# Patient Record
Sex: MALE | Race: WHITE | Employment: OTHER | URBAN - METROPOLITAN AREA
[De-identification: names, ages, dates, MRNs, and addresses within clinical notes are randomized per-mention and may not be internally consistent; named-entity substitution may affect disease eponyms.]

---

## 2017-02-22 ENCOUNTER — TELEPHONE (OUTPATIENT)
Dept: FAMILY MEDICINE CLINIC | Facility: CLINIC | Age: 72
End: 2017-02-22

## 2017-02-22 ENCOUNTER — OFFICE VISIT (OUTPATIENT)
Dept: FAMILY MEDICINE CLINIC | Facility: CLINIC | Age: 72
End: 2017-02-22

## 2017-02-22 ENCOUNTER — LAB ENCOUNTER (OUTPATIENT)
Dept: LAB | Age: 72
End: 2017-02-22
Attending: FAMILY MEDICINE
Payer: MEDICARE

## 2017-02-22 VITALS
DIASTOLIC BLOOD PRESSURE: 79 MMHG | HEART RATE: 79 BPM | SYSTOLIC BLOOD PRESSURE: 143 MMHG | BODY MASS INDEX: 29.92 KG/M2 | HEIGHT: 70 IN | WEIGHT: 209 LBS | TEMPERATURE: 98 F

## 2017-02-22 DIAGNOSIS — Z00.00 PHYSICAL EXAM: Primary | ICD-10-CM

## 2017-02-22 DIAGNOSIS — K63.5 POLYP OF SIGMOID COLON, UNSPECIFIED TYPE: ICD-10-CM

## 2017-02-22 DIAGNOSIS — K57.30 DIVERTICULOSIS OF LARGE INTESTINE WITHOUT HEMORRHAGE: ICD-10-CM

## 2017-02-22 DIAGNOSIS — H81.09 MENIERE DISEASE, UNSPECIFIED LATERALITY: ICD-10-CM

## 2017-02-22 DIAGNOSIS — E78.00 PURE HYPERCHOLESTEROLEMIA: ICD-10-CM

## 2017-02-22 DIAGNOSIS — L72.3 SEBACEOUS CYST: ICD-10-CM

## 2017-02-22 DIAGNOSIS — Z00.00 PHYSICAL EXAM: ICD-10-CM

## 2017-02-22 LAB
ALBUMIN SERPL BCP-MCNC: 3.9 G/DL (ref 3.5–4.8)
ALBUMIN/GLOB SERPL: 1.3 {RATIO} (ref 1–2)
ALP SERPL-CCNC: 62 U/L (ref 32–100)
ALT SERPL-CCNC: 25 U/L (ref 17–63)
ANION GAP SERPL CALC-SCNC: 7 MMOL/L (ref 0–18)
APPEARANCE: CLEAR
AST SERPL-CCNC: 33 U/L (ref 15–41)
BASOPHILS # BLD: 0 K/UL (ref 0–0.2)
BASOPHILS NFR BLD: 1 %
BILIRUB SERPL-MCNC: 1.2 MG/DL (ref 0.3–1.2)
BUN SERPL-MCNC: 16 MG/DL (ref 8–20)
BUN/CREAT SERPL: 13 (ref 10–20)
CALCIUM SERPL-MCNC: 9.2 MG/DL (ref 8.5–10.5)
CHLORIDE SERPL-SCNC: 106 MMOL/L (ref 95–110)
CHOLEST SERPL-MCNC: 160 MG/DL (ref 110–200)
CO2 SERPL-SCNC: 29 MMOL/L (ref 22–32)
CREAT SERPL-MCNC: 1.23 MG/DL (ref 0.5–1.5)
EOSINOPHIL # BLD: 0.1 K/UL (ref 0–0.7)
EOSINOPHIL NFR BLD: 1 %
ERYTHROCYTE [DISTWIDTH] IN BLOOD BY AUTOMATED COUNT: 14.2 % (ref 11–15)
GLOBULIN PLAS-MCNC: 3 G/DL (ref 2.5–3.7)
GLUCOSE (URINE DIPSTICK): NEGATIVE MG/DL
GLUCOSE SERPL-MCNC: 95 MG/DL (ref 70–99)
HBA1C MFR BLD: 5.9 % (ref 4–6)
HCT VFR BLD AUTO: 44.5 % (ref 41–52)
HDLC SERPL-MCNC: 37 MG/DL
HGB BLD-MCNC: 14.9 G/DL (ref 13.5–17.5)
KETONES (URINE DIPSTICK): NEGATIVE MG/DL
LDLC SERPL CALC-MCNC: 110 MG/DL (ref 0–99)
LEUKOCYTES: NEGATIVE
LYMPHOCYTES # BLD: 1.5 K/UL (ref 1–4)
LYMPHOCYTES NFR BLD: 30 %
MCH RBC QN AUTO: 29.7 PG (ref 27–32)
MCHC RBC AUTO-ENTMCNC: 33.4 G/DL (ref 32–37)
MCV RBC AUTO: 88.9 FL (ref 80–100)
MONOCYTES # BLD: 0.5 K/UL (ref 0–1)
MONOCYTES NFR BLD: 9 %
MULTISTIX LOT#: NORMAL NUMERIC
NEUTROPHILS # BLD AUTO: 3 K/UL (ref 1.8–7.7)
NEUTROPHILS NFR BLD: 59 %
NITRITE, URINE: NEGATIVE
NONHDLC SERPL-MCNC: 123 MG/DL
OSMOLALITY UR CALC.SUM OF ELEC: 295 MOSM/KG (ref 275–295)
PH, URINE: 7.5 (ref 4.5–8)
PLATELET # BLD AUTO: 194 K/UL (ref 140–400)
PMV BLD AUTO: 8.5 FL (ref 7.4–10.3)
POTASSIUM SERPL-SCNC: 4.2 MMOL/L (ref 3.3–5.1)
PROT SERPL-MCNC: 6.9 G/DL (ref 5.9–8.4)
PROTEIN (URINE DIPSTICK): NEGATIVE MG/DL
PSA SERPL-MCNC: 1.2 NG/ML (ref 0–4)
RBC # BLD AUTO: 5.01 M/UL (ref 4.5–5.9)
SODIUM SERPL-SCNC: 142 MMOL/L (ref 136–144)
SPECIFIC GRAVITY: 1 (ref 1–1.03)
TRIGL SERPL-MCNC: 65 MG/DL (ref 1–149)
TSH SERPL-ACNC: 1.95 UIU/ML (ref 0.34–5.6)
URINE-COLOR: YELLOW
UROBILINOGEN,SEMI-QN: 0.2 MG/DL (ref 0–1.9)
WBC # BLD AUTO: 5.1 K/UL (ref 4–11)

## 2017-02-22 PROCEDURE — 81002 URINALYSIS NONAUTO W/O SCOPE: CPT | Performed by: FAMILY MEDICINE

## 2017-02-22 PROCEDURE — 82306 VITAMIN D 25 HYDROXY: CPT | Performed by: FAMILY MEDICINE

## 2017-02-22 PROCEDURE — 96160 PT-FOCUSED HLTH RISK ASSMT: CPT | Performed by: FAMILY MEDICINE

## 2017-02-22 PROCEDURE — 93010 ELECTROCARDIOGRAM REPORT: CPT | Performed by: FAMILY MEDICINE

## 2017-02-22 PROCEDURE — 93005 ELECTROCARDIOGRAM TRACING: CPT | Performed by: FAMILY MEDICINE

## 2017-02-22 PROCEDURE — 80061 LIPID PANEL: CPT

## 2017-02-22 PROCEDURE — 83036 HEMOGLOBIN GLYCOSYLATED A1C: CPT

## 2017-02-22 PROCEDURE — G0009 ADMIN PNEUMOCOCCAL VACCINE: HCPCS | Performed by: FAMILY MEDICINE

## 2017-02-22 PROCEDURE — 36415 COLL VENOUS BLD VENIPUNCTURE: CPT

## 2017-02-22 PROCEDURE — 90732 PPSV23 VACC 2 YRS+ SUBQ/IM: CPT | Performed by: FAMILY MEDICINE

## 2017-02-22 PROCEDURE — 80053 COMPREHEN METABOLIC PANEL: CPT

## 2017-02-22 PROCEDURE — 85025 COMPLETE CBC W/AUTO DIFF WBC: CPT

## 2017-02-22 PROCEDURE — 84443 ASSAY THYROID STIM HORMONE: CPT

## 2017-02-22 PROCEDURE — 84153 ASSAY OF PSA TOTAL: CPT | Performed by: FAMILY MEDICINE

## 2017-02-22 RX ORDER — BRAN 5 G
1 WAFER ORAL DAILY
Qty: 100 TABLET | Refills: 6 | Status: SHIPPED | OUTPATIENT
Start: 2017-02-22 | End: 2018-03-14

## 2017-02-22 RX ORDER — CHLORAL HYDRATE 500 MG
1000 CAPSULE ORAL DAILY
COMMUNITY
End: 2017-12-18

## 2017-02-22 NOTE — PROGRESS NOTES
HPI:   Royal Montelongo is a 81year old male who presents for a Medicare Subsequent Annual Wellness visit (Pt already had Initial Annual Wellness). Royal Montelongo is a 81year old male is here for routine periodic health screening and examination.   His Meniere disease (2013); Onychomycosis (2010); Lipid screening (10/1/2015); and High cholesterol. He  has past surgical history that includes tonsillectomy; adenoidectomy; vasectomy; and colonoscopy.     His family history includes Dementia in his mother; following:    Height as of this encounter: 5' 10\" (1.778 m). Weight as of this encounter: 209 lb (94.802 kg).     Medicare Hearing Assessment  (Required for AWV/SWV)    Hearing Screening    Screening Method:  Questionnaire      I have a problem hearing light and accomodation bilaterally; clear sclera without icteric. Normal tympanic membranes, normal light reflex bilaterally. Normal finger rubbing hearing exam, bilaterally. Clear nostril normal nasal mucosa.   Throat clear without exudate, lesions or en this visit:    Physical exam  -     POC Urinalysis, Manual Dip without microscopy [[id number]]  -     CBC W Differential W Platelet [E]; Future  -     Comp Metabolic Panel (14) [E]; Future  -     EKG In-Office [[id number]]  -     Hemoglobin A1C [E];  Future  -     [facility] without microscopy [[id number]]  -     CBC W Differential W Platelet [E]; Future  -     Comp Metabolic Panel (14) [E]; Future  -     EKG In-Office [[id number]]  -     Hemoglobin A1C [E]; Future  -     Lipid Panel [E];  Future  -     POC Urinalysis, Manual Dip without intercourse until he is ready to form a family. Use of condoms may prevent transmission of infections as well as pregnancy.     FOLLOW-UP: Schedule a follow-up visit in 12 months.  - POC Urinalysis, Manual Dip without microscopy [[id number]]  - CBC W Differentia or a female age 47-67, do you take aspirin?: No    Have you had any immunizations at another office such as Influenza, Hepatitis B, Tetanus, or Pneumococcal?: Yes     Functional Ability     Bathing or Showering: Able without help    Toileting: Able without month is it?: Correct    What year is it?: Correct    Recall \"Ball\": Correct    Recall \"Flag\": Correct    Recall \"Tree\": Correct       This section provided for quick review of chart, separate sheet to patient  PREVENTATIVE SERVICES  INDICATIONS AND Annual Monitoring of Persistent     Medications (ACE/ARB, digoxin diuretics, anticonvulsants.)    Potassium  Annually POTASSIUM (P) (mmol/L)   Date Value   02/17/2016 4.0    No flowsheet data found.     Creatinine  Annually CREATININE (P) (mg/dL)   Date V

## 2017-02-22 NOTE — TELEPHONE ENCOUNTER
[facility] stated that Vitamins-Lipotropics (LIPOFLAVONOID) Oral Tab is discontinued, OTC multivitamin? Please advise.

## 2017-02-23 NOTE — TELEPHONE ENCOUNTER
DR Karon Chávez: please note what you need patient to take; apparently pharmacy does not carry Lipotropics.

## 2017-02-24 LAB — 25(OH)D3 SERPL-MCNC: 23.1 NG/ML

## 2017-02-27 ENCOUNTER — TELEPHONE (OUTPATIENT)
Dept: FAMILY MEDICINE CLINIC | Facility: CLINIC | Age: 72
End: 2017-02-27

## 2017-02-27 NOTE — TELEPHONE ENCOUNTER
----- Message from Anneliese Yeh MD sent at 2/22/2017  4:43 PM CST -----  Please call patient, results are within normal limits.

## 2017-07-19 ENCOUNTER — TELEPHONE (OUTPATIENT)
Dept: FAMILY MEDICINE CLINIC | Facility: CLINIC | Age: 72
End: 2017-07-19

## 2017-11-30 ENCOUNTER — TELEPHONE (OUTPATIENT)
Dept: INTERNAL MEDICINE CLINIC | Facility: CLINIC | Age: 72
End: 2017-11-30

## 2017-11-30 NOTE — TELEPHONE ENCOUNTER
Patient is eligible for a 2018 Annual Medicare Physical Exam.   Discussed in detail w/patient. Appt scheduled for 3/14/18.

## 2017-12-18 ENCOUNTER — OFFICE VISIT (OUTPATIENT)
Dept: FAMILY MEDICINE CLINIC | Facility: CLINIC | Age: 72
End: 2017-12-18

## 2017-12-18 VITALS
WEIGHT: 215 LBS | HEART RATE: 65 BPM | BODY MASS INDEX: 31 KG/M2 | TEMPERATURE: 98 F | DIASTOLIC BLOOD PRESSURE: 85 MMHG | SYSTOLIC BLOOD PRESSURE: 143 MMHG

## 2017-12-18 DIAGNOSIS — J22 ACUTE RESPIRATORY INFECTION: Primary | ICD-10-CM

## 2017-12-18 PROCEDURE — 99213 OFFICE O/P EST LOW 20 MIN: CPT | Performed by: FAMILY MEDICINE

## 2017-12-18 PROCEDURE — G0463 HOSPITAL OUTPT CLINIC VISIT: HCPCS | Performed by: FAMILY MEDICINE

## 2017-12-18 RX ORDER — SILDENAFIL 100 MG/1
100 TABLET, FILM COATED ORAL
Qty: 3 TABLET | Refills: 10 | Status: SHIPPED | OUTPATIENT
Start: 2017-12-18 | End: 2019-01-11

## 2017-12-18 RX ORDER — AZITHROMYCIN 250 MG/1
TABLET, FILM COATED ORAL
Qty: 6 TABLET | Refills: 0 | Status: SHIPPED | OUTPATIENT
Start: 2017-12-18 | End: 2018-03-14 | Stop reason: ALTCHOICE

## 2017-12-18 RX ORDER — CODEINE PHOSPHATE AND GUAIFENESIN 10; 100 MG/5ML; MG/5ML
5 SOLUTION ORAL EVERY 6 HOURS PRN
Qty: 120 ML | Refills: 1 | Status: SHIPPED | OUTPATIENT
Start: 2017-12-18 | End: 2018-03-14 | Stop reason: ALTCHOICE

## 2017-12-18 NOTE — PROGRESS NOTES
2017 10:58 AM    Antonieta Kebedered, : 1945  Patient presents with:  Chest Congestion: X 3 weeks   Runny Nose      HPI:     Antonieta Cooper is a 81year old male who presents for evaluation of a chief complaint of runny nose, sore throat, c Lipid screening 10/1/2015   • Meniere disease 2013   • Onychomycosis 2010    toes       Past Surgical History: Past Surgical History:  No date: ADENOIDECTOMY  No date: COLONOSCOPY  2013: COLONOSCOPY      Comment: colon poly  No date: TONSILLECTOMY  No date (Oral)   Wt 215 lb (97.5 kg)   BMI 30.85 kg/m²     GENERAL: well developed, well nourished, well hydrated, no distress  SKIN: good skin turgor, no obvious rashes  NECK: supple, no adenopathy, no thyromegaly  CARDIO: RRR without murmur  EXTREMITIES: no cyan call if new or worsening symptoms develop. Schedule a follow-up appointment  prn. Orders Placed This Encounter      azithromycin (ZITHROMAX Z-ABIGAIL) 250 MG Oral Tab          Sig: Take two tablets by mouth today, then one tablet daily.           Dis

## 2018-03-14 ENCOUNTER — OFFICE VISIT (OUTPATIENT)
Dept: FAMILY MEDICINE CLINIC | Facility: CLINIC | Age: 73
End: 2018-03-14

## 2018-03-14 ENCOUNTER — LAB ENCOUNTER (OUTPATIENT)
Dept: LAB | Age: 73
End: 2018-03-14
Attending: FAMILY MEDICINE
Payer: MEDICARE

## 2018-03-14 VITALS
BODY MASS INDEX: 30.49 KG/M2 | DIASTOLIC BLOOD PRESSURE: 91 MMHG | HEART RATE: 62 BPM | SYSTOLIC BLOOD PRESSURE: 144 MMHG | WEIGHT: 213 LBS | TEMPERATURE: 98 F | HEIGHT: 70 IN

## 2018-03-14 DIAGNOSIS — E78.00 PURE HYPERCHOLESTEROLEMIA: ICD-10-CM

## 2018-03-14 DIAGNOSIS — Z00.00 PHYSICAL EXAM: Primary | ICD-10-CM

## 2018-03-14 DIAGNOSIS — B35.1 ONYCHOMYCOSIS OF LEFT GREAT TOE: ICD-10-CM

## 2018-03-14 DIAGNOSIS — K57.30 DIVERTICULOSIS OF LARGE INTESTINE WITHOUT HEMORRHAGE: ICD-10-CM

## 2018-03-14 DIAGNOSIS — Z00.00 PHYSICAL EXAM: ICD-10-CM

## 2018-03-14 DIAGNOSIS — H81.09 MENIERE DISEASE, UNSPECIFIED LATERALITY: ICD-10-CM

## 2018-03-14 DIAGNOSIS — Z00.00 MEDICARE ANNUAL WELLNESS VISIT, SUBSEQUENT: ICD-10-CM

## 2018-03-14 DIAGNOSIS — K63.5 POLYP OF SIGMOID COLON, UNSPECIFIED TYPE: ICD-10-CM

## 2018-03-14 LAB
ALBUMIN SERPL BCP-MCNC: 4 G/DL (ref 3.5–4.8)
ALBUMIN/GLOB SERPL: 1.3 {RATIO} (ref 1–2)
ALP SERPL-CCNC: 66 U/L (ref 32–100)
ALT SERPL-CCNC: 25 U/L (ref 17–63)
ANION GAP SERPL CALC-SCNC: 4 MMOL/L (ref 0–18)
AST SERPL-CCNC: 39 U/L (ref 15–41)
BASOPHILS # BLD: 0 K/UL (ref 0–0.2)
BASOPHILS NFR BLD: 0 %
BILIRUB SERPL-MCNC: 1.1 MG/DL (ref 0.3–1.2)
BILIRUB UR QL: NEGATIVE
BUN SERPL-MCNC: 14 MG/DL (ref 8–20)
BUN/CREAT SERPL: 11.2 (ref 10–20)
CALCIUM SERPL-MCNC: 9.1 MG/DL (ref 8.5–10.5)
CHLORIDE SERPL-SCNC: 104 MMOL/L (ref 95–110)
CHOLEST SERPL-MCNC: 214 MG/DL (ref 110–200)
CLARITY UR: CLEAR
CO2 SERPL-SCNC: 31 MMOL/L (ref 22–32)
COLOR UR: YELLOW
CREAT SERPL-MCNC: 1.25 MG/DL (ref 0.5–1.5)
EOSINOPHIL # BLD: 0.1 K/UL (ref 0–0.7)
EOSINOPHIL NFR BLD: 1 %
ERYTHROCYTE [DISTWIDTH] IN BLOOD BY AUTOMATED COUNT: 14.2 % (ref 11–15)
GLOBULIN PLAS-MCNC: 3 G/DL (ref 2.5–3.7)
GLUCOSE SERPL-MCNC: 88 MG/DL (ref 70–99)
GLUCOSE UR-MCNC: NEGATIVE MG/DL
HBA1C MFR BLD: 5.7 % (ref 4–6)
HCT VFR BLD AUTO: 45.4 % (ref 41–52)
HDLC SERPL-MCNC: 44 MG/DL
HGB BLD-MCNC: 15.5 G/DL (ref 13.5–17.5)
HGB UR QL STRIP.AUTO: NEGATIVE
KETONES UR-MCNC: NEGATIVE MG/DL
LDLC SERPL CALC-MCNC: 154 MG/DL (ref 0–99)
LEUKOCYTE ESTERASE UR QL STRIP.AUTO: NEGATIVE
LYMPHOCYTES # BLD: 1.7 K/UL (ref 1–4)
LYMPHOCYTES NFR BLD: 28 %
MCH RBC QN AUTO: 30.7 PG (ref 27–32)
MCHC RBC AUTO-ENTMCNC: 34.1 G/DL (ref 32–37)
MCV RBC AUTO: 89.9 FL (ref 80–100)
MONOCYTES # BLD: 0.7 K/UL (ref 0–1)
MONOCYTES NFR BLD: 12 %
NEUTROPHILS # BLD AUTO: 3.6 K/UL (ref 1.8–7.7)
NEUTROPHILS NFR BLD: 59 %
NITRITE UR QL STRIP.AUTO: NEGATIVE
NONHDLC SERPL-MCNC: 170 MG/DL
OSMOLALITY UR CALC.SUM OF ELEC: 288 MOSM/KG (ref 275–295)
PATIENT FASTING: YES
PH UR: 6 [PH] (ref 5–8)
PLATELET # BLD AUTO: 209 K/UL (ref 140–400)
PMV BLD AUTO: 8.3 FL (ref 7.4–10.3)
POTASSIUM SERPL-SCNC: 4.1 MMOL/L (ref 3.3–5.1)
PROT SERPL-MCNC: 7 G/DL (ref 5.9–8.4)
PROT UR-MCNC: 30 MG/DL
PSA SERPL-MCNC: 1.3 NG/ML (ref 0–4)
RBC # BLD AUTO: 5.05 M/UL (ref 4.5–5.9)
RBC #/AREA URNS AUTO: 1 /HPF
RBC #/AREA URNS AUTO: 2 /HPF
SODIUM SERPL-SCNC: 139 MMOL/L (ref 136–144)
SP GR UR STRIP: 1.03 (ref 1–1.03)
TRIGL SERPL-MCNC: 78 MG/DL (ref 1–149)
TSH SERPL-ACNC: 2.91 UIU/ML (ref 0.45–5.33)
UROBILINOGEN UR STRIP-ACNC: 2
VIT C UR-MCNC: NEGATIVE MG/DL
WBC # BLD AUTO: 6.2 K/UL (ref 4–11)
WBC #/AREA URNS AUTO: 1 /HPF
WBC #/AREA URNS AUTO: 1 /HPF

## 2018-03-14 PROCEDURE — 81001 URINALYSIS AUTO W/SCOPE: CPT

## 2018-03-14 PROCEDURE — 93010 ELECTROCARDIOGRAM REPORT: CPT | Performed by: FAMILY MEDICINE

## 2018-03-14 PROCEDURE — G0439 PPPS, SUBSEQ VISIT: HCPCS | Performed by: FAMILY MEDICINE

## 2018-03-14 PROCEDURE — 96160 PT-FOCUSED HLTH RISK ASSMT: CPT | Performed by: FAMILY MEDICINE

## 2018-03-14 PROCEDURE — 85025 COMPLETE CBC W/AUTO DIFF WBC: CPT

## 2018-03-14 PROCEDURE — 84153 ASSAY OF PSA TOTAL: CPT | Performed by: FAMILY MEDICINE

## 2018-03-14 PROCEDURE — 81015 MICROSCOPIC EXAM OF URINE: CPT | Performed by: FAMILY MEDICINE

## 2018-03-14 PROCEDURE — 93005 ELECTROCARDIOGRAM TRACING: CPT

## 2018-03-14 PROCEDURE — 83036 HEMOGLOBIN GLYCOSYLATED A1C: CPT

## 2018-03-14 PROCEDURE — 84443 ASSAY THYROID STIM HORMONE: CPT

## 2018-03-14 PROCEDURE — 80053 COMPREHEN METABOLIC PANEL: CPT

## 2018-03-14 PROCEDURE — 36415 COLL VENOUS BLD VENIPUNCTURE: CPT

## 2018-03-14 PROCEDURE — 80061 LIPID PANEL: CPT

## 2018-03-14 PROCEDURE — 82306 VITAMIN D 25 HYDROXY: CPT | Performed by: FAMILY MEDICINE

## 2018-03-14 RX ORDER — MECLIZINE HCL 12.5 MG/1
12.5 TABLET ORAL 3 TIMES DAILY PRN
Qty: 40 TABLET | Refills: 0 | Status: SHIPPED | OUTPATIENT
Start: 2018-03-14 | End: 2019-01-11

## 2018-03-14 RX ORDER — FLUCONAZOLE 200 MG/1
200 TABLET ORAL WEEKLY
Qty: 12 TABLET | Refills: 2 | Status: SHIPPED | OUTPATIENT
Start: 2018-03-14 | End: 2018-04-13

## 2018-03-14 NOTE — PROGRESS NOTES
Please call patient, results are within normal limits, except high cholesterol make sure he is taking his medication and following a low fat diet.

## 2018-03-14 NOTE — PROGRESS NOTES
HPI:   Cathleen Leyva is a 72year old male who presents for a Medicare Subsequent Annual Wellness visit (Pt already had Initial Annual Wellness).     Cathleen Leyva is a 72year old male is here for routine periodic health screening and examination this document but we do not have it in [facility], and patient is instructed to get our office a copy of it for scanning into Epic.            He has never smoked tobacco.    CAGE Alcohol screening   Man Hanley was screened for Alcohol abuse and had a sco adenoidectomy; vasectomy; colonoscopy; and colonoscopy (2013). His family history includes Dementia in his mother; Diabetes in his brother and sister; Hypertension in his brother; Lipids in his brother.    SOCIAL HISTORY:   He  reports that he has never following:    Height as of this encounter: 5' 10\" (1.778 m). Weight as of this encounter: 213 lb (96.6 kg).     Medicare Hearing Assessment  (Required for AWV/SWV)    Hearing Screening    Screening Method:  Questionnaire  I have a problem hearing over t cephalic, normal light red reflex; pupils equally reactive to light and accomodation bilaterally; clear sclera without icteric. Normal tympanic membranes, normal light reflex bilaterally. Normal finger rubbing hearing exam, bilaterally.   Clear nostril nor Future  -     EKG 12-LEAD; Future  -     HEMOGLOBIN A1C; Future  -     LIPID PANEL;  Future  -     PSA TOTAL W REFLEX TO FREE  -     TSH W REFLEX TO FREE T4; Future  -     URINALYSIS, ROUTINE; Future  -     URINE MICROSCOPIC W REFLEX CULTURE  -     VITAMIN W Reflex To Free T4 [E]      Urinalysis, Routine [E]      Urine Microscopic w Reflex CULTURE      Vitamin D, 25-Hydroxy [E]   In-House; Urine dip. Test/Procedures done today include: EKG. IMMUNIZATIONS: none given today.     RECOMMENDATIONS given includ tablet 2      Sig: Take 1 tablet (200 mg total) by mouth once a week. RECOMMENDATIONS given include: Please, call our office with any questions or concerns.  Notify Dr Saeid Ny or the [facility] if there is a deterioration or worsening of the med 06/03/2023 Update Health Maintenance if applicable    Flex Sigmoidoscopy Screen every 10 years No results found for this or any previous visit. No flowsheet data found. Fecal Occult Blood Annually No results found for: FOB No flowsheet data found.     G

## 2018-03-16 LAB — 25(OH)D3 SERPL-MCNC: 24.3 NG/ML

## 2018-03-17 RX ORDER — ERGOCALCIFEROL 1.25 MG/1
50000 CAPSULE ORAL WEEKLY
Qty: 12 CAPSULE | Refills: 4 | Status: SHIPPED | OUTPATIENT
Start: 2018-03-17 | End: 2018-04-16

## 2018-03-26 ENCOUNTER — TELEPHONE (OUTPATIENT)
Dept: FAMILY MEDICINE CLINIC | Facility: CLINIC | Age: 73
End: 2018-03-26

## 2018-03-26 DIAGNOSIS — H90.6 MIXED CONDUCTIVE AND SENSORINEURAL HEARING LOSS OF BOTH EARS: Primary | ICD-10-CM

## 2018-03-26 NOTE — TELEPHONE ENCOUNTER
[facility] calling to confirm what cholestrol meds should have been called in per the Pt. And states need a script for it.

## 2018-03-27 NOTE — TELEPHONE ENCOUNTER
Per chart review patient is not on any cholesterol medication. Patient was on Omega-3 tablets before but that was discontinued. Message routed to provider to confirm what cholesterol medication is patient suppose to be on.        Please reply to pool: LESLY WALTERS

## 2018-04-16 ENCOUNTER — OFFICE VISIT (OUTPATIENT)
Dept: AUDIOLOGY | Facility: CLINIC | Age: 73
End: 2018-04-16

## 2018-04-16 DIAGNOSIS — H93.11 TINNITUS, RIGHT: ICD-10-CM

## 2018-04-16 DIAGNOSIS — R42 DIZZINESS: ICD-10-CM

## 2018-04-16 DIAGNOSIS — H90.3 HEARING LOSS, SENSORINEURAL, ASYMMETRICAL: Primary | ICD-10-CM

## 2018-04-16 PROCEDURE — 92557 COMPREHENSIVE HEARING TEST: CPT | Performed by: AUDIOLOGIST

## 2018-04-16 PROCEDURE — 92567 TYMPANOMETRY: CPT | Performed by: AUDIOLOGIST

## 2018-04-16 NOTE — PROGRESS NOTES
AUDIOGRAM     Manuel Hamilton was referred for testing by Abhi Donnelly. 1/2/1945  [id number]    Pt noted a history of right sudden hearing loss followed by dizziness and tinnitus.  PT noted difficulty hearing/understanding speech on his right side, isolation, depression, and cognitive decline. Pt expressed interest in amplification. A benefit verification will be completed and then pt will schedule a hearing aid evaluation. Recommendations:   Follow-up with Dr Lucas Herndon;  RTC for a hearing aid e

## 2018-04-23 ENCOUNTER — TELEPHONE (OUTPATIENT)
Dept: AUDIOLOGY | Facility: CLINIC | Age: 73
End: 2018-04-23

## 2018-04-23 NOTE — TELEPHONE ENCOUNTER
Please provide the CPT for the hearing aid device.      Thank you,  [facility]   [phone]

## 2018-04-24 NOTE — H&P
[address] [facility] [address] Gastroenterology                                                                                                  Clinic History and Physical     Pa TONSILLECTOMY  No date: VASECTOMY   Family Hx:   Family History   Problem Relation Age of Onset   • Dementia Mother      Alzheimer's   • Hypertension Brother    • Lipids Brother      hyperlipidemia   • Diabetes Brother      type 2   • Diabetes Sister fever  ENDOCRINE:  negative for cold intolerance and heat intolerance  MUSCULOSKELETAL:  negative for joint effusion/severe erythema  BEHAVIOR/PSYCH:  negative for psychotic behavior      PHYSICAL EXAM:   Blood pressure 132/82, pulse 79, height 5' 10\" (1. colyte or trilyte.   -Continue all medications as prescribed    Colonoscopy consent: I have discussed the risks, benefits, and alternatives to colonoscopy with the patient [who demonstrated understanding], including but not limited to the risks of bleeding

## 2018-04-25 ENCOUNTER — OFFICE VISIT (OUTPATIENT)
Dept: GASTROENTEROLOGY | Facility: CLINIC | Age: 73
End: 2018-04-25

## 2018-04-25 ENCOUNTER — TELEPHONE (OUTPATIENT)
Dept: GASTROENTEROLOGY | Facility: CLINIC | Age: 73
End: 2018-04-25

## 2018-04-25 VITALS
BODY MASS INDEX: 30.61 KG/M2 | SYSTOLIC BLOOD PRESSURE: 132 MMHG | DIASTOLIC BLOOD PRESSURE: 82 MMHG | HEART RATE: 79 BPM | WEIGHT: 213.81 LBS | HEIGHT: 70 IN

## 2018-04-25 DIAGNOSIS — K63.5 POLYP OF COLON, UNSPECIFIED PART OF COLON, UNSPECIFIED TYPE: ICD-10-CM

## 2018-04-25 DIAGNOSIS — Z80.0 FAMILY HX OF COLON CANCER: ICD-10-CM

## 2018-04-25 DIAGNOSIS — K57.30 DIVERTICULOSIS OF LARGE INTESTINE WITHOUT DIVERTICULITIS: ICD-10-CM

## 2018-04-25 DIAGNOSIS — Z12.11 ENCOUNTER FOR SCREENING COLONOSCOPY: ICD-10-CM

## 2018-04-25 DIAGNOSIS — Z12.11 COLON CANCER SCREENING: Primary | ICD-10-CM

## 2018-04-25 DIAGNOSIS — K57.30 DIVERTICULOSIS OF LARGE INTESTINE WITHOUT HEMORRHAGE: ICD-10-CM

## 2018-04-25 DIAGNOSIS — K63.5 POLYP OF COLON, UNSPECIFIED PART OF COLON, UNSPECIFIED TYPE: Primary | ICD-10-CM

## 2018-04-25 DIAGNOSIS — Z80.0 FAMILY HISTORY OF COLON CANCER: ICD-10-CM

## 2018-04-25 PROCEDURE — G0463 HOSPITAL OUTPT CLINIC VISIT: HCPCS | Performed by: PHYSICIAN ASSISTANT

## 2018-04-25 PROCEDURE — 99203 OFFICE O/P NEW LOW 30 MIN: CPT | Performed by: PHYSICIAN ASSISTANT

## 2018-04-25 RX ORDER — VITAMIN E (DL,TOCOPHERYL ACET) 400 UNIT
400 TABLET,CHEWABLE ORAL DAILY
COMMUNITY
Start: 2018-04-01 | End: 2019-03-16

## 2018-04-25 NOTE — TELEPHONE ENCOUNTER
Scheduled for:  Colonoscopy [id number]  Provider Name: Nigel Ocampo  Date:  5/1/18  Location:  [facility]   Sedation:  IVCS  Time:  0845 / Arrival 0745  Prep: Split dose Suprep  Meds/Allergies Reconciled?:  Physician reviewed   Diagnosis with codes: Polyp of colon, unspec

## 2018-05-01 ENCOUNTER — HOSPITAL ENCOUNTER (OUTPATIENT)
Facility: HOSPITAL | Age: 73
Setting detail: HOSPITAL OUTPATIENT SURGERY
Discharge: HOME | End: 2018-05-01
Attending: INTERNAL MEDICINE | Admitting: INTERNAL MEDICINE
Payer: MEDICARE

## 2018-05-01 ENCOUNTER — SURGERY (OUTPATIENT)
Age: 73
End: 2018-05-01

## 2018-05-01 DIAGNOSIS — K57.30 DIVERTICULOSIS OF LARGE INTESTINE WITHOUT DIVERTICULITIS: ICD-10-CM

## 2018-05-01 DIAGNOSIS — K63.5 POLYP OF COLON, UNSPECIFIED PART OF COLON, UNSPECIFIED TYPE: ICD-10-CM

## 2018-05-01 DIAGNOSIS — Z12.11 COLON CANCER SCREENING: ICD-10-CM

## 2018-05-01 DIAGNOSIS — Z80.0 FAMILY HISTORY OF COLON CANCER: ICD-10-CM

## 2018-05-01 PROCEDURE — 0DJD8ZZ INSPECTION OF LOWER INTESTINAL TRACT, VIA NATURAL OR ARTIFICIAL OPENING ENDOSCOPIC: ICD-10-PCS | Performed by: INTERNAL MEDICINE

## 2018-05-01 PROCEDURE — G0500 MOD SEDAT ENDO SERVICE >5YRS: HCPCS | Performed by: INTERNAL MEDICINE

## 2018-05-01 PROCEDURE — G0121 COLON CA SCRN NOT HI RSK IND: HCPCS | Performed by: INTERNAL MEDICINE

## 2018-05-01 RX ORDER — SODIUM CHLORIDE, SODIUM LACTATE, POTASSIUM CHLORIDE, CALCIUM CHLORIDE 600; 310; 30; 20 MG/100ML; MG/100ML; MG/100ML; MG/100ML
INJECTION, SOLUTION INTRAVENOUS CONTINUOUS
Status: DISCONTINUED | OUTPATIENT
Start: 2018-05-01 | End: 2018-05-01

## 2018-05-01 RX ORDER — SODIUM CHLORIDE 0.9 % (FLUSH) 0.9 %
10 SYRINGE (ML) INJECTION AS NEEDED
Status: DISCONTINUED | OUTPATIENT
Start: 2018-05-01 | End: 2018-05-01

## 2018-05-01 RX ORDER — MIDAZOLAM HYDROCHLORIDE 1 MG/ML
1 INJECTION INTRAMUSCULAR; INTRAVENOUS EVERY 5 MIN PRN
Status: DISCONTINUED | OUTPATIENT
Start: 2018-05-01 | End: 2018-05-01

## 2018-05-01 RX ORDER — MIDAZOLAM HYDROCHLORIDE 1 MG/ML
INJECTION INTRAMUSCULAR; INTRAVENOUS
Status: DISCONTINUED | OUTPATIENT
Start: 2018-05-01 | End: 2018-05-01

## 2018-05-01 NOTE — OPERATIVE REPORT
COLONOSCOPY REPORT    Carl Berg     1945 Age 72year old   PCP Berna Vann MD Endoscopist Pedro Luis Mercado MD     Date of procedure: 18    Procedure: Colonoscopy     Pre-operative diagnosis: Screening    Post-operative diagnos and anal papillae. 5. The colonic mucosa throughout the colon showed normal vascular pattern, without evidence of angioectasias or inflammation. 6. KEVIN: normal rectal tone, no masses palpated. Impression:   · Diverticulosis of the colon.   · Inte

## 2018-05-01 NOTE — H&P
History & Physical Examination    Patient Name: Edward Tubbs  MRN: [id number]  [facility]: [id number]  YOB: 1945    Diagnosis: screening for colon cancer, hx of colon polyps      Prescriptions Prior to Admission:  CALCIUM/VITAMIN D3/ADULT GUMMY 25 Comment: colon polyp  No date: TONSILLECTOMY  No date: VASECTOMY  Family History   Problem Relation Age of Onset   • Dementia Mother      Alzheimer's   • Hypertension Brother    • Lipids Brother      hyperlipidemia   • Diabetes Brother      type 2   • Diab

## 2018-05-02 VITALS
BODY MASS INDEX: 28.85 KG/M2 | RESPIRATION RATE: 15 BRPM | OXYGEN SATURATION: 95 % | WEIGHT: 213 LBS | SYSTOLIC BLOOD PRESSURE: 113 MMHG | DIASTOLIC BLOOD PRESSURE: 81 MMHG | HEART RATE: 51 BPM | HEIGHT: 72 IN

## 2018-05-03 ENCOUNTER — OFFICE VISIT (OUTPATIENT)
Dept: AUDIOLOGY | Facility: CLINIC | Age: 73
End: 2018-05-03

## 2018-05-03 ENCOUNTER — TELEPHONE (OUTPATIENT)
Dept: GASTROENTEROLOGY | Facility: CLINIC | Age: 73
End: 2018-05-03

## 2018-05-03 DIAGNOSIS — H90.3 HEARING LOSS, SENSORINEURAL, ASYMMETRICAL: Primary | ICD-10-CM

## 2018-05-03 PROCEDURE — 92591 HEARING AID EXAM, BOTH EARS: CPT | Performed by: AUDIOLOGIST

## 2018-05-03 NOTE — TELEPHONE ENCOUNTER
Entered into EPIC:Recall colon in 5 years per Dr. Yanet Solomon. Last Colon done 5/1/18, next due 5/1/23. Snapshot updated.

## 2018-05-04 NOTE — PROGRESS NOTES
Hearing Aid Evaluation    Bina Jauregui  1/2/1945  [id number]    Bina Jauregui is a first time user. For fitting of poorer , right ear benefits expected would be:  1. Better/easier speechreading  2. Better localization  3.   Brightening up left ear Casey

## 2018-06-27 ENCOUNTER — TELEPHONE (OUTPATIENT)
Dept: FAMILY MEDICINE CLINIC | Facility: CLINIC | Age: 73
End: 2018-06-27

## 2018-06-27 RX ORDER — ATORVASTATIN CALCIUM 20 MG/1
20 TABLET, FILM COATED ORAL NIGHTLY
Qty: 90 TABLET | Refills: 1 | Status: SHIPPED | OUTPATIENT
Start: 2018-06-27 | End: 2018-12-29

## 2018-06-27 NOTE — TELEPHONE ENCOUNTER
Patient states that the pharmacy has tried phoning and sending the information to the office stating that his insurance does not cover his cholesterol medication (pt is not sure of the name).  Pt would like to know if the doctor can prescribe a different me

## 2018-06-27 NOTE — TELEPHONE ENCOUNTER
Spoke with pharmacy who reports the Guillermo Nj is not covered under the patient's insurance but atorvastatin and simvastatin are covered. Patient is leaving to [facility] tomorrow, and would like a new prescription before he leaves.  Message routed to provider for

## 2018-12-29 NOTE — TELEPHONE ENCOUNTER
Please review; protocol failed. High LDL. MD review required.     Cholesterol Medications  Protocol Criteria:  · Appointment scheduled in the past 12 months or in the next 3 months  · ALT & LDL on file in the past 12 months  · ALT result < 80  · LDL result

## 2018-12-31 RX ORDER — ATORVASTATIN CALCIUM 20 MG/1
TABLET, FILM COATED ORAL
Qty: 90 TABLET | Refills: 1 | Status: SHIPPED | OUTPATIENT
Start: 2018-12-31 | End: 2019-03-16

## 2019-01-11 ENCOUNTER — OFFICE VISIT (OUTPATIENT)
Dept: FAMILY MEDICINE CLINIC | Facility: CLINIC | Age: 74
End: 2019-01-11
Payer: MEDICARE

## 2019-01-11 ENCOUNTER — NURSE TRIAGE (OUTPATIENT)
Dept: OTHER | Age: 74
End: 2019-01-11

## 2019-01-11 VITALS
HEIGHT: 72 IN | DIASTOLIC BLOOD PRESSURE: 80 MMHG | BODY MASS INDEX: 29.44 KG/M2 | TEMPERATURE: 98 F | WEIGHT: 217.38 LBS | HEART RATE: 68 BPM | SYSTOLIC BLOOD PRESSURE: 152 MMHG

## 2019-01-11 DIAGNOSIS — J22 ACUTE RESPIRATORY INFECTION: Primary | ICD-10-CM

## 2019-01-11 PROCEDURE — 99213 OFFICE O/P EST LOW 20 MIN: CPT | Performed by: FAMILY MEDICINE

## 2019-01-11 PROCEDURE — G0463 HOSPITAL OUTPT CLINIC VISIT: HCPCS | Performed by: FAMILY MEDICINE

## 2019-01-11 RX ORDER — SILDENAFIL 100 MG/1
100 TABLET, FILM COATED ORAL
Qty: 3 TABLET | Refills: 10 | Status: SHIPPED | OUTPATIENT
Start: 2019-01-11 | End: 2019-03-16

## 2019-01-11 RX ORDER — CODEINE PHOSPHATE AND GUAIFENESIN 10; 100 MG/5ML; MG/5ML
5 SOLUTION ORAL EVERY 6 HOURS PRN
Qty: 120 ML | Refills: 1 | Status: SHIPPED | OUTPATIENT
Start: 2019-01-11 | End: 2019-01-31

## 2019-01-11 RX ORDER — AZITHROMYCIN 250 MG/1
TABLET, FILM COATED ORAL
Qty: 6 TABLET | Refills: 0 | Status: SHIPPED | OUTPATIENT
Start: 2019-01-11 | End: 2019-01-31

## 2019-01-11 NOTE — TELEPHONE ENCOUNTER
Please call patient to set up an appointment with me, possibly today, now or my next day office schedule.

## 2019-01-11 NOTE — PROGRESS NOTES
2019 11:09 AM    Anastacia Martins, : 1945  Patient presents with:  URI: cough, nasal discharge, body aches, sneezing, chest congestion, nasal congestion x 6 days    HPI:     Anastacia Martins is a 69year old male who presents for evaluation of a 20120   • Hearing impairment     right ear, ringing    • High cholesterol    • Hyperlipidemia     dx'd in 2010   • Lipid screening 10/1/2015   • Meniere disease 2013   • Onychomycosis 2010    toes       Past Surgical History:   Past Surgical History:   Pro (13138)                          09/30/2015      Fluvirin, 3 Years & >, Im                          10/22/2008      Influenza             09/28/2009      Pneumovax 23          02/22/2017      TD                    04/29/2006      TDAP                  02/1 drainage  THROAT: redness noted  LUNGS: clear to auscultation bilaterally; no rales, rhonchi, or wheezes  ABDOMINAL: Soft NABS, ND, NT    Labs performed this visit:  No results found for this or any previous visit (from the past 10 hour(s)).     MDM/Assessm

## 2019-01-11 NOTE — TELEPHONE ENCOUNTER
Action Requested: Summary for Provider     []  Critical Lab, Recommendations Needed  [x] Need Additional Advice  []   FYI    []   Need Orders  [] Need Medications Sent to Pharmacy  []  Other     SUMMARY: patient has had a cough with yellow phlegm, runny no

## 2019-01-31 ENCOUNTER — APPOINTMENT (OUTPATIENT)
Dept: LAB | Age: 74
End: 2019-01-31
Attending: FAMILY MEDICINE
Payer: MEDICARE

## 2019-01-31 ENCOUNTER — OFFICE VISIT (OUTPATIENT)
Dept: FAMILY MEDICINE CLINIC | Facility: CLINIC | Age: 74
End: 2019-01-31
Payer: MEDICARE

## 2019-01-31 VITALS
HEIGHT: 72 IN | WEIGHT: 217 LBS | TEMPERATURE: 99 F | SYSTOLIC BLOOD PRESSURE: 147 MMHG | DIASTOLIC BLOOD PRESSURE: 85 MMHG | BODY MASS INDEX: 29.39 KG/M2 | HEART RATE: 64 BPM

## 2019-01-31 DIAGNOSIS — R33.9 URINE RETENTION: Primary | ICD-10-CM

## 2019-01-31 LAB
PSA FREE MFR SERPL: 7 %
PSA FREE SERPL-MCNC: 0.29 NG/ML

## 2019-01-31 PROCEDURE — 84154 ASSAY OF PSA FREE: CPT | Performed by: FAMILY MEDICINE

## 2019-01-31 PROCEDURE — 84153 ASSAY OF PSA TOTAL: CPT | Performed by: FAMILY MEDICINE

## 2019-01-31 PROCEDURE — 99213 OFFICE O/P EST LOW 20 MIN: CPT | Performed by: FAMILY MEDICINE

## 2019-01-31 PROCEDURE — 36415 COLL VENOUS BLD VENIPUNCTURE: CPT | Performed by: FAMILY MEDICINE

## 2019-01-31 PROCEDURE — G0463 HOSPITAL OUTPT CLINIC VISIT: HCPCS | Performed by: FAMILY MEDICINE

## 2019-01-31 RX ORDER — ERGOCALCIFEROL 1.25 MG/1
CAPSULE ORAL
COMMUNITY
Start: 2019-01-12 | End: 2019-03-16

## 2019-01-31 NOTE — PROGRESS NOTES
1/31/2019  2:24 PM    Huma Jacobs is a 69year old male. Chief complaint(s): Patient presents with:  Prostate Problem    HPI:     Huma Jacobs primary complaint is regarding urine retention.      Patient is a 29-year-old male who experienced diffi 02/22/2017      TD                    04/29/2006      TDAP                  02/17/2016      Zoster Vaccine Live (Zostavax)                          02/17/2016      Medications (Active prior to today's visit):    Current Outpatient Medications:  Ergocalci Negative for rash. Neurological: Negative for seizures and headaches. Psychiatric/Behavioral: Negative for depressed mood. PHYSICAL EXAM:   Physical Exam    Constitutional: He appears well-developed and well-nourished.    /85 (BP Location: R encounter       Imaging & Referrals:  Dorys Andersen MD

## 2019-02-01 LAB
PSA SERPL-MCNC: 4.3 NG/ML (ref 0–4)
PSA SERPL-MCNC: 4.69 NG/ML (ref 0.01–4)

## 2019-02-18 ENCOUNTER — APPOINTMENT (OUTPATIENT)
Dept: LAB | Facility: HOSPITAL | Age: 74
End: 2019-02-18
Attending: UROLOGY
Payer: MEDICARE

## 2019-02-18 ENCOUNTER — OFFICE VISIT (OUTPATIENT)
Dept: SURGERY | Facility: CLINIC | Age: 74
End: 2019-02-18
Payer: MEDICARE

## 2019-02-18 VITALS
HEIGHT: 72 IN | HEART RATE: 65 BPM | BODY MASS INDEX: 29.39 KG/M2 | TEMPERATURE: 98 F | SYSTOLIC BLOOD PRESSURE: 133 MMHG | DIASTOLIC BLOOD PRESSURE: 80 MMHG | WEIGHT: 217 LBS

## 2019-02-18 DIAGNOSIS — R39.198 SLOW URINARY STREAM: Primary | ICD-10-CM

## 2019-02-18 DIAGNOSIS — R39.198 SLOW URINARY STREAM: ICD-10-CM

## 2019-02-18 LAB
BILIRUB UR QL: NEGATIVE
CLARITY UR: CLEAR
COLOR UR: YELLOW
GLUCOSE UR-MCNC: NEGATIVE MG/DL
HGB UR QL STRIP.AUTO: NEGATIVE
KETONES UR-MCNC: NEGATIVE MG/DL
LEUKOCYTE ESTERASE UR QL STRIP.AUTO: NEGATIVE
NITRITE UR QL STRIP.AUTO: NEGATIVE
PH UR: 5 [PH] (ref 5–8)
PROT UR-MCNC: NEGATIVE MG/DL
RBC #/AREA URNS AUTO: 5 /HPF
SP GR UR STRIP: 1.03 (ref 1–1.03)
UROBILINOGEN UR STRIP-ACNC: <2
VIT C UR-MCNC: 20 MG/DL
WBC #/AREA URNS AUTO: 1 /HPF

## 2019-02-18 PROCEDURE — 81003 URINALYSIS AUTO W/O SCOPE: CPT

## 2019-02-18 PROCEDURE — G0463 HOSPITAL OUTPT CLINIC VISIT: HCPCS | Performed by: UROLOGY

## 2019-02-18 PROCEDURE — 99204 OFFICE O/P NEW MOD 45 MIN: CPT | Performed by: UROLOGY

## 2019-02-18 PROCEDURE — 87086 URINE CULTURE/COLONY COUNT: CPT

## 2019-02-18 NOTE — PROGRESS NOTES
SUBJECTIVE:  Sincere Ramirez is a 69year old male who presents for a consultation at the request of, and a copy of this note will be sent to, Dr. Myra Robison, for evaluation of  Slow stream.  He states about 3 weeks ago had an episode where he had a of breath,  sputum production,chest pain or pleurisy. CARDIOVASCULAR:  Negative for pain or chest discomfort, dizziness or fainting, palpitations, leg swelling, nocturia, or claudication.   GASTROINTESTINAL:  Negative for nausea, vomiting, diarrhea, consti erectile dysfunction. Patient understands plan and agrees and will follow up accordingly.     Meds This Visit:  Requested Prescriptions      No prescriptions requested or ordered in this encounter       Imaging & Referrals:  None

## 2019-02-25 ENCOUNTER — TELEPHONE (OUTPATIENT)
Dept: SURGERY | Facility: CLINIC | Age: 74
End: 2019-02-25

## 2019-02-25 DIAGNOSIS — R31.29 MICROHEMATURIA: Primary | ICD-10-CM

## 2019-02-25 NOTE — TELEPHONE ENCOUNTER
Patient with positive RBC on UA. Urine culture negative. Would recommend work up regarding microscopic hematuria in the form of CT urogram and office cystoscopy.     I have placed the order for the CT urogram.  Patient will need to be scheduled for [facility]

## 2019-03-04 NOTE — TELEPHONE ENCOUNTER
Pt is RT RNs call to get his test results. Pt is aware that the office is closed and will re-open tomorrow.

## 2019-03-05 ENCOUNTER — TELEPHONE (OUTPATIENT)
Dept: SURGERY | Facility: CLINIC | Age: 74
End: 2019-03-05

## 2019-03-05 NOTE — TELEPHONE ENCOUNTER
I called pt with the assistance of Thai interpretor Micaela, ID # [id number] and I informed pt of [facility]'s results msg and instructions below.  I explained the cysto procedure in detail and also all of the prep instructions and I gave the pt an appt for offic

## 2019-03-05 NOTE — TELEPHONE ENCOUNTER
Please check for PA for CT urogram. I told pt that we will call him when authorized and will give central schdg # at that time.

## 2019-03-08 NOTE — TELEPHONE ENCOUNTER
Called and spoke with patient. Explained that we had obtained approval for his test. Transferred his call over the central scheduling to make an appointment.

## 2019-03-08 NOTE — TELEPHONE ENCOUNTER
Pt procedure/Testing: CT Urogram  Pt insurance/number to contact: [employer] / [employer] ID# and group: [id number]    Submitted case online via [employer]. Service was auto-approved. Auth # [id number] valid from 3/8/2019 to 6/6/2019.

## 2019-03-16 ENCOUNTER — OFFICE VISIT (OUTPATIENT)
Dept: FAMILY MEDICINE CLINIC | Facility: CLINIC | Age: 74
End: 2019-03-16
Payer: MEDICARE

## 2019-03-16 ENCOUNTER — LAB ENCOUNTER (OUTPATIENT)
Dept: LAB | Age: 74
End: 2019-03-16
Attending: FAMILY MEDICINE
Payer: MEDICARE

## 2019-03-16 ENCOUNTER — APPOINTMENT (OUTPATIENT)
Dept: LAB | Age: 74
End: 2019-03-16
Attending: FAMILY MEDICINE
Payer: MEDICARE

## 2019-03-16 VITALS
TEMPERATURE: 98 F | WEIGHT: 208.38 LBS | SYSTOLIC BLOOD PRESSURE: 120 MMHG | BODY MASS INDEX: 28.22 KG/M2 | HEIGHT: 72 IN | HEART RATE: 57 BPM | DIASTOLIC BLOOD PRESSURE: 74 MMHG

## 2019-03-16 DIAGNOSIS — H90.6 MIXED CONDUCTIVE AND SENSORINEURAL HEARING LOSS OF BOTH EARS: ICD-10-CM

## 2019-03-16 DIAGNOSIS — K63.5 POLYP OF SIGMOID COLON, UNSPECIFIED TYPE: ICD-10-CM

## 2019-03-16 DIAGNOSIS — Z00.00 MEDICARE ANNUAL WELLNESS VISIT, SUBSEQUENT: Primary | ICD-10-CM

## 2019-03-16 DIAGNOSIS — R97.20 ELEVATED PSA: ICD-10-CM

## 2019-03-16 DIAGNOSIS — K57.30 DIVERTICULOSIS OF LARGE INTESTINE WITHOUT HEMORRHAGE: ICD-10-CM

## 2019-03-16 DIAGNOSIS — E78.00 PURE HYPERCHOLESTEROLEMIA: ICD-10-CM

## 2019-03-16 DIAGNOSIS — Z00.00 MEDICARE ANNUAL WELLNESS VISIT, SUBSEQUENT: ICD-10-CM

## 2019-03-16 DIAGNOSIS — B35.1 ONYCHOMYCOSIS OF LEFT GREAT TOE: ICD-10-CM

## 2019-03-16 PROBLEM — N18.30 CKD (CHRONIC KIDNEY DISEASE) STAGE 3, GFR 30-59 ML/MIN (HCC): Chronic | Status: ACTIVE | Noted: 2019-03-16

## 2019-03-16 LAB
ALBUMIN SERPL-MCNC: 3.6 G/DL (ref 3.4–5)
ALBUMIN/GLOB SERPL: 0.9 {RATIO} (ref 1–2)
ALP LIVER SERPL-CCNC: 81 U/L (ref 45–117)
ALT SERPL-CCNC: 74 U/L (ref 16–61)
ANION GAP SERPL CALC-SCNC: 6 MMOL/L (ref 0–18)
AST SERPL-CCNC: 50 U/L (ref 15–37)
BASOPHILS # BLD AUTO: 0.03 X10(3) UL (ref 0–0.2)
BASOPHILS NFR BLD AUTO: 0.5 %
BILIRUB SERPL-MCNC: 0.9 MG/DL (ref 0.1–2)
BILIRUB UR QL: NEGATIVE
BUN BLD-MCNC: 18 MG/DL (ref 7–18)
BUN/CREAT SERPL: 14.9 (ref 10–20)
CALCIUM BLD-MCNC: 9.1 MG/DL (ref 8.5–10.1)
CHLORIDE SERPL-SCNC: 106 MMOL/L (ref 98–107)
CHOLEST SMN-MCNC: 127 MG/DL (ref ?–200)
CLARITY UR: CLEAR
CO2 SERPL-SCNC: 29 MMOL/L (ref 21–32)
COLOR UR: YELLOW
CREAT BLD-MCNC: 1.21 MG/DL (ref 0.7–1.3)
DEPRECATED RDW RBC AUTO: 44.1 FL (ref 35.1–46.3)
EOSINOPHIL # BLD AUTO: 0.21 X10(3) UL (ref 0–0.7)
EOSINOPHIL NFR BLD AUTO: 3.4 %
ERYTHROCYTE [DISTWIDTH] IN BLOOD BY AUTOMATED COUNT: 13.3 % (ref 11–15)
EST. AVERAGE GLUCOSE BLD GHB EST-MCNC: 131 MG/DL (ref 68–126)
GLOBULIN PLAS-MCNC: 4 G/DL (ref 2.8–4.4)
GLUCOSE BLD-MCNC: 97 MG/DL (ref 70–99)
GLUCOSE UR-MCNC: NEGATIVE MG/DL
HBA1C MFR BLD HPLC: 6.2 % (ref ?–5.7)
HCT VFR BLD AUTO: 45 % (ref 39–53)
HDLC SERPL-MCNC: 35 MG/DL (ref 40–59)
HGB BLD-MCNC: 14.7 G/DL (ref 13–17.5)
HGB UR QL STRIP.AUTO: NEGATIVE
IMM GRANULOCYTES # BLD AUTO: 0.02 X10(3) UL (ref 0–1)
IMM GRANULOCYTES NFR BLD: 0.3 %
KETONES UR-MCNC: NEGATIVE MG/DL
LDLC SERPL CALC-MCNC: 77 MG/DL (ref ?–100)
LYMPHOCYTES # BLD AUTO: 1.66 X10(3) UL (ref 1–4)
LYMPHOCYTES NFR BLD AUTO: 27.2 %
M PROTEIN MFR SERPL ELPH: 7.6 G/DL (ref 6.4–8.2)
MCH RBC QN AUTO: 29.7 PG (ref 26–34)
MCHC RBC AUTO-ENTMCNC: 32.7 G/DL (ref 31–37)
MCV RBC AUTO: 90.9 FL (ref 80–100)
MONOCYTES # BLD AUTO: 0.54 X10(3) UL (ref 0.1–1)
MONOCYTES NFR BLD AUTO: 8.9 %
NEUTROPHILS # BLD AUTO: 3.64 X10 (3) UL (ref 1.5–7.7)
NEUTROPHILS # BLD AUTO: 3.64 X10(3) UL (ref 1.5–7.7)
NEUTROPHILS NFR BLD AUTO: 59.7 %
NITRITE UR QL STRIP.AUTO: NEGATIVE
NONHDLC SERPL-MCNC: 92 MG/DL (ref ?–130)
OSMOLALITY SERPL CALC.SUM OF ELEC: 294 MOSM/KG (ref 275–295)
PH UR: 5 [PH] (ref 5–8)
PLATELET # BLD AUTO: 224 10(3)UL (ref 150–450)
POTASSIUM SERPL-SCNC: 4.1 MMOL/L (ref 3.5–5.1)
PROT UR-MCNC: 30 MG/DL
PSA SERPL-MCNC: 1.41 NG/ML (ref ?–4)
RBC # BLD AUTO: 4.95 X10(6)UL (ref 3.8–5.8)
RBC #/AREA URNS AUTO: 1 /HPF
RBC #/AREA URNS AUTO: 1 /HPF
SODIUM SERPL-SCNC: 141 MMOL/L (ref 136–145)
SP GR UR STRIP: 1.03 (ref 1–1.03)
TRIGL SERPL-MCNC: 76 MG/DL (ref 30–149)
TSI SER-ACNC: 2.74 MIU/ML (ref 0.36–3.74)
UROBILINOGEN UR STRIP-ACNC: <2
VIT C UR-MCNC: NEGATIVE MG/DL
VLDLC SERPL CALC-MCNC: 15 MG/DL (ref 0–30)
WBC # BLD AUTO: 6.1 X10(3) UL (ref 4–11)
WBC #/AREA URNS AUTO: 3 /HPF
WBC #/AREA URNS AUTO: 3 /HPF

## 2019-03-16 PROCEDURE — 96160 PT-FOCUSED HLTH RISK ASSMT: CPT | Performed by: FAMILY MEDICINE

## 2019-03-16 PROCEDURE — 82306 VITAMIN D 25 HYDROXY: CPT | Performed by: FAMILY MEDICINE

## 2019-03-16 PROCEDURE — 90670 PCV13 VACCINE IM: CPT | Performed by: FAMILY MEDICINE

## 2019-03-16 PROCEDURE — 84443 ASSAY THYROID STIM HORMONE: CPT

## 2019-03-16 PROCEDURE — 80061 LIPID PANEL: CPT

## 2019-03-16 PROCEDURE — 84153 ASSAY OF PSA TOTAL: CPT | Performed by: FAMILY MEDICINE

## 2019-03-16 PROCEDURE — 83036 HEMOGLOBIN GLYCOSYLATED A1C: CPT

## 2019-03-16 PROCEDURE — 85025 COMPLETE CBC W/AUTO DIFF WBC: CPT

## 2019-03-16 PROCEDURE — 93005 ELECTROCARDIOGRAM TRACING: CPT

## 2019-03-16 PROCEDURE — G0463 HOSPITAL OUTPT CLINIC VISIT: HCPCS | Performed by: FAMILY MEDICINE

## 2019-03-16 PROCEDURE — 81001 URINALYSIS AUTO W/SCOPE: CPT | Performed by: FAMILY MEDICINE

## 2019-03-16 PROCEDURE — G0439 PPPS, SUBSEQ VISIT: HCPCS | Performed by: FAMILY MEDICINE

## 2019-03-16 PROCEDURE — G0009 ADMIN PNEUMOCOCCAL VACCINE: HCPCS | Performed by: FAMILY MEDICINE

## 2019-03-16 PROCEDURE — 93010 ELECTROCARDIOGRAM REPORT: CPT | Performed by: FAMILY MEDICINE

## 2019-03-16 PROCEDURE — 81015 MICROSCOPIC EXAM OF URINE: CPT | Performed by: FAMILY MEDICINE

## 2019-03-16 PROCEDURE — 36415 COLL VENOUS BLD VENIPUNCTURE: CPT

## 2019-03-16 PROCEDURE — 80053 COMPREHEN METABOLIC PANEL: CPT

## 2019-03-16 RX ORDER — FLUCONAZOLE 200 MG/1
200 TABLET ORAL WEEKLY
Qty: 12 TABLET | Refills: 2 | Status: SHIPPED | OUTPATIENT
Start: 2019-03-16 | End: 2019-04-15

## 2019-03-16 NOTE — PROGRESS NOTES
HPI:   Fatoumata Morocho is a 69year old male who presents for a Medicare Subsequent Annual Wellness visit (Pt already had Initial Annual Wellness). Fatoumata Morocho is a 69year old male is here for routine periodic health screening and examination.   His Family/surrogate (if present), and forms available to patient in AVS       He does NOT have a Power of  for [employer] on file in [facility]    Advance care planning including the explanation and discussion of advance directives standard forms performed past medical history of Colon polyps (2010), Diverticulosis (20120), Hearing impairment, High cholesterol, Hyperlipidemia, Lipid screening (10/1/2015), Meniere disease (2013), and Onychomycosis (2010).     He  has a past surgical history that includes tonsi Cuff Size: adult)   Pulse 57   Temp 97.5 °F (36.4 °C) (Oral)   Ht 6' (1.829 m)   Wt 208 lb 6.4 oz (94.5 kg)   BMI 28.26 kg/m²   Estimated body mass index is 28.26 kg/m² as calculated from the following:    Height as of this encounter: 6' (1.829 m).     Vu Romeo Psychiatric:   Appropriate affect and misdemeanor.        Vaccination History     Immunization History   Administered Date(s) Administered   • FLU VACC High Dose 65 YRS & Older PRSV Free (96745) 09/30/2015   • Fluvirin, 3 Years & >, Im 10/22/2008   • Infl daily or at least 3 times a week for 30-60 minutes doing cardiovascular exercise. Encourage to maintain the best physical and dental hygiene possible.   Consider a  if overweight and/or having difficult in staying active; attempt to keep a s Cholesterol (mg/dL)   Date Value   03/14/2018 154 (H)   02/17/2016 124 (H)        EKG - w/ Initial Preventative Physical Exam only, or if medically necessary Electrocardiogram date03/14/2018    Colorectal Cancer Screening      Colonoscopy Screen every 10 y

## 2019-03-18 LAB — 25(OH)D3 SERPL-MCNC: 40.4 NG/ML (ref 30–100)

## 2019-03-27 ENCOUNTER — TELEPHONE (OUTPATIENT)
Dept: SURGERY | Facility: CLINIC | Age: 74
End: 2019-03-27

## 2019-04-15 ENCOUNTER — TELEPHONE (OUTPATIENT)
Dept: SURGERY | Facility: CLINIC | Age: 74
End: 2019-04-15

## 2019-04-15 NOTE — TELEPHONE ENCOUNTER
LMTCB.  When patient calls back, please try to transfer to RN [phone] ) [phone] or [phone]. Justin PASTOR,  Patient has not completed his CT urogram, do you think his cysto should be rescheduled?

## 2019-04-15 NOTE — TELEPHONE ENCOUNTER
Pt has cysto procedure tomorrow 4/16 and asking for the instructions he needs to follow prior to procedure. Asking if he can eat prior, will the he be sedated, and asking how painful the procedure is.

## 2019-04-15 NOTE — TELEPHONE ENCOUNTER
Spoke to Soto Romero from [facility] and confirmed order for CT scan. Soto Romero verbalized understanding.

## 2019-04-15 NOTE — TELEPHONE ENCOUNTER
Yes, I would have patient reschedule and complete CT urogram prior.     Thank you,  Virgen PASTOR  [facility]

## 2019-04-15 NOTE — TELEPHONE ENCOUNTER
First, spoke to patient and informed him that he needs to complete the CT scan first before having cysto, will cancel cysto tomorrow, number to central scheduling given to patient.       Next, utilized the language line,  ID# [id number], to inform pa

## 2019-04-15 NOTE — TELEPHONE ENCOUNTER
darwin from [facility] scheduling called to verify an order for a ct scan. Call transferred to the rn.

## 2019-04-16 ENCOUNTER — HOSPITAL ENCOUNTER (OUTPATIENT)
Dept: CT IMAGING | Facility: HOSPITAL | Age: 74
Discharge: HOME | End: 2019-04-16
Attending: NURSE PRACTITIONER
Payer: MEDICARE

## 2019-04-16 DIAGNOSIS — R31.29 MICROHEMATURIA: ICD-10-CM

## 2019-04-16 PROCEDURE — 82565 ASSAY OF CREATININE: CPT

## 2019-04-16 PROCEDURE — 74178 CT ABD&PLV WO CNTR FLWD CNTR: CPT | Performed by: NURSE PRACTITIONER

## 2019-04-17 ENCOUNTER — TELEPHONE (OUTPATIENT)
Dept: SURGERY | Facility: CLINIC | Age: 74
End: 2019-04-17

## 2019-04-17 NOTE — TELEPHONE ENCOUNTER
----- Message from WILDA Martin sent at 4/17/2019 10:31 AM CDT -----  Staff,    Please let patient know his CT urogram is normal.  There are no stones or suspicious masses or lesions. He has some degenerative changes with in the spine.   He do

## 2019-04-18 ENCOUNTER — OFFICE VISIT (OUTPATIENT)
Dept: SURGERY | Facility: CLINIC | Age: 74
End: 2019-04-18
Payer: MEDICARE

## 2019-04-18 VITALS
SYSTOLIC BLOOD PRESSURE: 118 MMHG | TEMPERATURE: 99 F | HEART RATE: 55 BPM | DIASTOLIC BLOOD PRESSURE: 78 MMHG | WEIGHT: 205 LBS | HEIGHT: 72 IN | BODY MASS INDEX: 27.77 KG/M2

## 2019-04-18 DIAGNOSIS — R31.29 MICROHEMATURIA: Primary | ICD-10-CM

## 2019-04-18 DIAGNOSIS — R39.198 SLOW URINARY STREAM: ICD-10-CM

## 2019-04-18 PROCEDURE — 99213 OFFICE O/P EST LOW 20 MIN: CPT | Performed by: UROLOGY

## 2019-04-18 PROCEDURE — G0463 HOSPITAL OUTPT CLINIC VISIT: HCPCS | Performed by: UROLOGY

## 2019-04-18 PROCEDURE — 52000 CYSTOURETHROSCOPY: CPT | Performed by: UROLOGY

## 2019-04-18 RX ORDER — CIPROFLOXACIN 500 MG/1
500 TABLET, FILM COATED ORAL ONCE
Status: COMPLETED | OUTPATIENT
Start: 2019-04-18 | End: 2019-04-18

## 2019-04-18 RX ORDER — TAMSULOSIN HYDROCHLORIDE 0.4 MG/1
0.4 CAPSULE ORAL DAILY
Qty: 30 CAPSULE | Refills: 11 | Status: SHIPPED | OUTPATIENT
Start: 2019-04-18 | End: 2019-05-18

## 2019-04-18 RX ADMIN — CIPROFLOXACIN 500 MG: 500 TABLET, FILM COATED ORAL at 09:34:00

## 2019-04-18 NOTE — PROGRESS NOTES
Iris Lam is a 69year old male. HPI:   Patient presents with:  Procedure: Patient presents today for cystoscopy with RU    24-year-old male presents for office cystoscopy.   He was seen in the office February18, 2019 for intermittent slow stream HISTORY:  Past Medical History:   Diagnosis Date   • Colon polyps 2010    no polyps on CLN in 2018, repeat CLN in 4/2023   • Diverticulosis 20120   • Hearing impairment     right ear, ringing    • High cholesterol    • Hyperlipidemia     dx'd i Normal  Trabeculation: none.   Minimal bulbar urethral stricture, easily passed the 16 Jordanian flexible cystoscope      POST CYSTOSCOPY MEDICATIONS: sample one tablet Cipro 500mg given to patient    DIAGNOSIS: See below    PLAN: See below         ASSESSMENT/

## 2019-06-14 ENCOUNTER — OFFICE VISIT (OUTPATIENT)
Dept: FAMILY MEDICINE CLINIC | Facility: CLINIC | Age: 74
End: 2019-06-14
Payer: MEDICARE

## 2019-06-14 VITALS
TEMPERATURE: 98 F | HEART RATE: 60 BPM | SYSTOLIC BLOOD PRESSURE: 139 MMHG | HEIGHT: 72 IN | WEIGHT: 212 LBS | BODY MASS INDEX: 28.71 KG/M2 | DIASTOLIC BLOOD PRESSURE: 80 MMHG

## 2019-06-14 DIAGNOSIS — R35.1 BENIGN PROSTATIC HYPERPLASIA WITH NOCTURIA: Primary | ICD-10-CM

## 2019-06-14 DIAGNOSIS — N40.1 BENIGN PROSTATIC HYPERPLASIA WITH NOCTURIA: Primary | ICD-10-CM

## 2019-06-14 PROCEDURE — 99213 OFFICE O/P EST LOW 20 MIN: CPT | Performed by: FAMILY MEDICINE

## 2019-06-14 PROCEDURE — G0463 HOSPITAL OUTPT CLINIC VISIT: HCPCS | Performed by: FAMILY MEDICINE

## 2019-06-14 RX ORDER — ATORVASTATIN CALCIUM 10 MG/1
10 TABLET, FILM COATED ORAL NIGHTLY
Qty: 90 TABLET | Refills: 3 | Status: SHIPPED | OUTPATIENT
Start: 2019-06-14 | End: 2020-11-11

## 2019-06-14 RX ORDER — ATORVASTATIN CALCIUM 20 MG/1
TABLET, FILM COATED ORAL
COMMUNITY
Start: 2019-06-01 | End: 2019-06-14

## 2019-06-14 RX ORDER — TAMSULOSIN HYDROCHLORIDE 0.4 MG/1
CAPSULE ORAL DAILY
COMMUNITY
End: 2019-09-25

## 2019-06-14 RX ORDER — MECLIZINE HCL 12.5 MG/1
12.5 TABLET ORAL 3 TIMES DAILY PRN
Qty: 60 TABLET | Refills: 2 | Status: SHIPPED | OUTPATIENT
Start: 2019-06-14

## 2019-09-25 ENCOUNTER — OFFICE VISIT (OUTPATIENT)
Dept: FAMILY MEDICINE CLINIC | Facility: CLINIC | Age: 74
End: 2019-09-25
Payer: MEDICARE

## 2019-09-25 VITALS
DIASTOLIC BLOOD PRESSURE: 83 MMHG | WEIGHT: 209.81 LBS | HEIGHT: 72 IN | BODY MASS INDEX: 28.42 KG/M2 | HEART RATE: 71 BPM | TEMPERATURE: 98 F | SYSTOLIC BLOOD PRESSURE: 133 MMHG

## 2019-09-25 DIAGNOSIS — J22 ACUTE RESPIRATORY INFECTION: Primary | ICD-10-CM

## 2019-09-25 DIAGNOSIS — J02.9 SORE THROAT: ICD-10-CM

## 2019-09-25 LAB
CONTROL LINE PRESENT WITH A CLEAR BACKGROUND (YES/NO): YES YES/NO
KIT LOT #: NORMAL NUMERIC
STREP GRP A CUL-SCR: NEGATIVE

## 2019-09-25 PROCEDURE — 87880 STREP A ASSAY W/OPTIC: CPT | Performed by: FAMILY MEDICINE

## 2019-09-25 PROCEDURE — 99213 OFFICE O/P EST LOW 20 MIN: CPT | Performed by: FAMILY MEDICINE

## 2019-09-25 RX ORDER — TADALAFIL 20 MG/1
20 TABLET ORAL AS NEEDED
Qty: 3 TABLET | Refills: 9 | Status: SHIPPED | OUTPATIENT
Start: 2019-09-25 | End: 2019-10-31

## 2019-09-25 RX ORDER — TAMSULOSIN HYDROCHLORIDE 0.4 MG/1
0.4 CAPSULE ORAL DAILY
Qty: 90 CAPSULE | Refills: 3 | Status: SHIPPED | OUTPATIENT
Start: 2019-09-25 | End: 2020-11-11

## 2019-09-25 RX ORDER — AZITHROMYCIN 250 MG/1
TABLET, FILM COATED ORAL
Qty: 6 TABLET | Refills: 0 | Status: SHIPPED | OUTPATIENT
Start: 2019-09-25 | End: 2019-10-31 | Stop reason: ALTCHOICE

## 2019-09-25 NOTE — PROGRESS NOTES
2019 9:53 AM    Dioniciojayehakeem Light, : 1945  Patient presents with:  Cold: nasal congestion, throat, body aches, minor cough    HPI:     Mushtaq Light is a 69year old male who presents for evaluation of a chief complaint of fever, runny nose and 10/1/2015   • Meniere disease 2013   • Onychomycosis 2010    toes       Past Surgical History:   Past Surgical History:   Procedure Laterality Date   • ADENOIDECTOMY     • COLONOSCOPY     • COLONOSCOPY  2013    colon polyp   • COLONOSCOPY N/A 5/1/2018    P Not Asked          coffee        Hobby Hazards: Not Asked        Sleep Concern: Not Asked        Stress Concern: Not Asked        Weight Concern: Not Asked        Special Diet: Not Asked        Back Care: Not Asked        Exercise: Not Asked        Bike He sclera non icteric bilateral, conjunctiva clear  EARS: TM  bilateral: normal  NOSE: nasal turbinates: pink, normal mucosa  THROAT: redness noted  LUNGS: clear to auscultation bilaterally; no rales, rhonchi, or wheezes and rhonchi: apical  ABDOMINAL: Soft N call if new or worsening symptoms develop. Schedule a follow-up appointment  prn.          Orders Placed This Encounter      POC Rapid Strep [[id number]]      Tadalafil (CIALIS) 20 MG Oral Tab          Sig: Take 1 tablet (20 mg total) by mouth as needed for Ere

## 2019-10-08 ENCOUNTER — TELEPHONE (OUTPATIENT)
Dept: OTHER | Age: 74
End: 2019-10-08

## 2019-10-08 NOTE — TELEPHONE ENCOUNTER
PA denied for Tadalafil. Patients plan states medication is not covered under Medicare part D. This is a plan exclusion. No alternative medications provided.

## 2019-10-28 RX ORDER — SILDENAFIL 100 MG/1
100 TABLET, FILM COATED ORAL AS NEEDED
Qty: 3 TABLET | Refills: 5 | Status: SHIPPED | OUTPATIENT
Start: 2019-10-28 | End: 2021-08-06

## 2019-10-28 NOTE — TELEPHONE ENCOUNTER
Phone call received from patient and he communicated to Dr Stalin Geller that he did purchase medication out of his pocket but did not like it. Alternative medication was sent to pharmacy p/ Dr Stalin Geller.

## 2019-10-31 ENCOUNTER — OFFICE VISIT (OUTPATIENT)
Dept: FAMILY MEDICINE CLINIC | Facility: CLINIC | Age: 74
End: 2019-10-31
Payer: MEDICARE

## 2019-10-31 ENCOUNTER — LAB ENCOUNTER (OUTPATIENT)
Dept: LAB | Age: 74
End: 2019-10-31
Attending: FAMILY MEDICINE
Payer: MEDICARE

## 2019-10-31 VITALS
HEART RATE: 56 BPM | DIASTOLIC BLOOD PRESSURE: 83 MMHG | BODY MASS INDEX: 29.26 KG/M2 | SYSTOLIC BLOOD PRESSURE: 144 MMHG | HEIGHT: 72 IN | WEIGHT: 216 LBS | TEMPERATURE: 98 F

## 2019-10-31 DIAGNOSIS — R79.89 ELEVATED LFTS: Primary | ICD-10-CM

## 2019-10-31 DIAGNOSIS — R73.03 PREDIABETES: ICD-10-CM

## 2019-10-31 DIAGNOSIS — N52.9 ERECTILE DYSFUNCTION: Primary | ICD-10-CM

## 2019-10-31 DIAGNOSIS — N52.9 ERECTILE DYSFUNCTION, UNSPECIFIED ERECTILE DYSFUNCTION TYPE: ICD-10-CM

## 2019-10-31 PROCEDURE — 99213 OFFICE O/P EST LOW 20 MIN: CPT | Performed by: FAMILY MEDICINE

## 2019-10-31 PROCEDURE — 90662 IIV NO PRSV INCREASED AG IM: CPT | Performed by: FAMILY MEDICINE

## 2019-10-31 PROCEDURE — 84403 ASSAY OF TOTAL TESTOSTERONE: CPT

## 2019-10-31 PROCEDURE — 84402 ASSAY OF FREE TESTOSTERONE: CPT

## 2019-10-31 PROCEDURE — 36415 COLL VENOUS BLD VENIPUNCTURE: CPT

## 2019-10-31 PROCEDURE — G0008 ADMIN INFLUENZA VIRUS VAC: HCPCS | Performed by: FAMILY MEDICINE

## 2019-10-31 NOTE — PROGRESS NOTES
10/31/2019  11:05 AM    Huma Jacobs is a 69year old male. Chief complaint(s): Patient presents with: Follow - Up: f/u on elevated LFT & flu shot  BPH/ ED  HPI:     Huma Jacobs primary complaint is regarding as above.      Patient is here for fo Zaira Naranjo MD at [facility] ENDOSCOPY   • TONSILLECTOMY     • VASECTOMY        Family History   Problem Relation Age of Onset   • Dementia Mother         Alzheimer's   • Hypertension Brother    • Lipids Brother         hyperlipidemia   • Diabetes Brother Negative for chest pain and palpitations. Genitourinary: Positive for sexual dysfunction. Negative for dysuria, frequency and nocturia. Musculoskeletal: Negative for back pain. Skin: Negative for rash. Neurological: Negative for headaches.    Psychi given include: Please, call our office with any questions or concerns. Notify Dr Corrine Rizo or the [facility] if there is a deterioration or worsening of the medical condition. Also, inform the doctor with any new symptoms or medications' side effects.

## 2019-12-09 ENCOUNTER — OFFICE VISIT (OUTPATIENT)
Dept: SURGERY | Facility: CLINIC | Age: 74
End: 2019-12-09
Payer: MEDICARE

## 2019-12-09 VITALS
DIASTOLIC BLOOD PRESSURE: 76 MMHG | WEIGHT: 216 LBS | SYSTOLIC BLOOD PRESSURE: 134 MMHG | BODY MASS INDEX: 29 KG/M2 | HEART RATE: 53 BPM

## 2019-12-09 DIAGNOSIS — N52.9 ERECTILE DYSFUNCTION, UNSPECIFIED ERECTILE DYSFUNCTION TYPE: ICD-10-CM

## 2019-12-09 DIAGNOSIS — R31.29 MICROHEMATURIA: Primary | ICD-10-CM

## 2019-12-09 PROCEDURE — 99213 OFFICE O/P EST LOW 20 MIN: CPT | Performed by: UROLOGY

## 2019-12-09 RX ORDER — SILDENAFIL 100 MG/1
100 TABLET, FILM COATED ORAL
Qty: 3 TABLET | Refills: 11 | Status: SHIPPED | OUTPATIENT
Start: 2019-12-09 | End: 2020-11-11

## 2019-12-09 NOTE — PROGRESS NOTES
Leann Oakley is a 69year old male.     HPI:   Patient presents with:  Erectile Dysfuntion: patient presents for ed consult      59-year-old male who I saw in the office April 2019 for evaluation of lower urinary tract symptoms status post cystoscopy at mouth daily as needed for Erectile Dysfunction. 3 tablet 11   • Sildenafil Citrate (VIAGRA) 100 MG Oral Tab Take 1 tablet (100 mg total) by mouth as needed for Erectile Dysfunction.  3 tablet 5   • tamsulosin HCl 0.4 MG Oral Cap Take 1 capsule (0.4 mg total

## 2019-12-26 ENCOUNTER — OFFICE VISIT (OUTPATIENT)
Dept: FAMILY MEDICINE CLINIC | Facility: CLINIC | Age: 74
End: 2019-12-26
Payer: MEDICARE

## 2019-12-26 VITALS
BODY MASS INDEX: 27.9 KG/M2 | HEART RATE: 75 BPM | WEIGHT: 206 LBS | TEMPERATURE: 98 F | RESPIRATION RATE: 18 BRPM | HEIGHT: 72 IN | DIASTOLIC BLOOD PRESSURE: 79 MMHG | SYSTOLIC BLOOD PRESSURE: 135 MMHG

## 2019-12-26 DIAGNOSIS — E78.00 PURE HYPERCHOLESTEROLEMIA: Primary | ICD-10-CM

## 2019-12-26 PROCEDURE — 99213 OFFICE O/P EST LOW 20 MIN: CPT | Performed by: FAMILY MEDICINE

## 2019-12-26 NOTE — PROGRESS NOTES
12/26/2019  1:41 PM    Andres Harrison is a 69year old male. Chief complaint(s): Patient presents with:  Hyperlipidemia    HPI:     Andres Harrison primary complaint is regarding as above.        In regard to the hyperlipidemia, he has had hypercholeste (88588)                          09/30/2015  10/15/2018  10/31/2019      Fluvirin, 3 Years & >, Im                          10/22/2008      Fluzone Vaccine Medicare ()                          10/15/2018      Influenza             09/28/2009      Pneu 75   Temp 97.9 °F (36.6 °C) (Oral)   Resp 18   Ht 6' (1.829 m)   Wt 206 lb (93.4 kg)   BMI 27.94 kg/m²    HENT:   Head: Normocephalic. Eyes: Conjunctivae are normal. No scleral icterus. Neck: Neck supple. Cardiovascular: Normal rate.     Skin: No rash

## 2020-01-03 NOTE — TELEPHONE ENCOUNTER
I spoke with pt and informed him of the info and instructions in [facility] msg below and he stated that he will need a new appt for the cysto since we were unable to perform the last one because he had not completed the CT urogram yet.  I gave a new appt for to Home

## 2020-06-16 ENCOUNTER — OFFICE VISIT (OUTPATIENT)
Dept: FAMILY MEDICINE CLINIC | Facility: CLINIC | Age: 75
End: 2020-06-16
Payer: MEDICARE

## 2020-06-16 VITALS
HEART RATE: 69 BPM | SYSTOLIC BLOOD PRESSURE: 132 MMHG | RESPIRATION RATE: 19 BRPM | BODY MASS INDEX: 28.63 KG/M2 | WEIGHT: 211.38 LBS | OXYGEN SATURATION: 97 % | HEIGHT: 72 IN | TEMPERATURE: 98 F | DIASTOLIC BLOOD PRESSURE: 80 MMHG

## 2020-06-16 DIAGNOSIS — K57.30 DIVERTICULOSIS OF LARGE INTESTINE WITHOUT HEMORRHAGE: ICD-10-CM

## 2020-06-16 DIAGNOSIS — N40.1 BENIGN PROSTATIC HYPERPLASIA WITH NOCTURIA: ICD-10-CM

## 2020-06-16 DIAGNOSIS — R35.1 BENIGN PROSTATIC HYPERPLASIA WITH NOCTURIA: ICD-10-CM

## 2020-06-16 DIAGNOSIS — E55.9 HYPOVITAMINOSIS D: ICD-10-CM

## 2020-06-16 DIAGNOSIS — E78.00 PURE HYPERCHOLESTEROLEMIA: ICD-10-CM

## 2020-06-16 DIAGNOSIS — H81.09 MENIERE'S DISEASE, UNSPECIFIED LATERALITY: ICD-10-CM

## 2020-06-16 DIAGNOSIS — R97.20 ELEVATED PSA: ICD-10-CM

## 2020-06-16 DIAGNOSIS — R79.89 ELEVATED LFTS: ICD-10-CM

## 2020-06-16 DIAGNOSIS — Z13.820 OSTEOPOROSIS SCREENING: ICD-10-CM

## 2020-06-16 DIAGNOSIS — R73.03 PREDIABETES: ICD-10-CM

## 2020-06-16 DIAGNOSIS — K63.5 POLYP OF SIGMOID COLON, UNSPECIFIED TYPE: ICD-10-CM

## 2020-06-16 DIAGNOSIS — H90.6 MIXED CONDUCTIVE AND SENSORINEURAL HEARING LOSS OF BOTH EARS: ICD-10-CM

## 2020-06-16 DIAGNOSIS — Z00.00 MEDICARE ANNUAL WELLNESS VISIT, SUBSEQUENT: Primary | ICD-10-CM

## 2020-06-16 DIAGNOSIS — N18.30 CKD (CHRONIC KIDNEY DISEASE) STAGE 3, GFR 30-59 ML/MIN (HCC): Chronic | ICD-10-CM

## 2020-06-16 DIAGNOSIS — R73.9 HYPERGLYCEMIA: ICD-10-CM

## 2020-06-16 DIAGNOSIS — N52.9 ERECTILE DYSFUNCTION, UNSPECIFIED ERECTILE DYSFUNCTION TYPE: ICD-10-CM

## 2020-06-16 PROCEDURE — 99397 PER PM REEVAL EST PAT 65+ YR: CPT | Performed by: FAMILY MEDICINE

## 2020-06-16 PROCEDURE — 96160 PT-FOCUSED HLTH RISK ASSMT: CPT | Performed by: FAMILY MEDICINE

## 2020-06-16 PROCEDURE — G0439 PPPS, SUBSEQ VISIT: HCPCS | Performed by: FAMILY MEDICINE

## 2020-06-16 NOTE — PROGRESS NOTES
HPI:   Alexandra Brown is a 70year old male who presents for a Medicare Subsequent Annual Wellness visit (Pt already had Initial Annual Wellness). Alexandra Brown is a 70year old male is here for routine periodic health screening and examination.   His ago.  Social History    Tobacco Use      Smoking status: Former Smoker      Smokeless tobacco: Never Used         CAGE Alcohol screening   Nohemy Cummins was screened for Alcohol abuse and had a score of 0 so is at low risk.      Patient Care Team: Patient in his brother. SOCIAL HISTORY:   He  reports that he has quit smoking. He has never used smokeless tobacco. He reports current alcohol use. He reports that he does not use drugs.      REVIEW OF SYSTEMS:   Review of Systems   Constitutional: Negative for conversations:  No   I have to worry about missing the telephone ring or doorbell:  No I have trouble hearing conversations in a noisy background such as a crowded room or restaurant:  Yes   I get confused about where sounds come from:  Yes I misunderstand distension and no mass. There is no splenomegaly or hepatomegaly. There is no tenderness. Hernia confirmed negative in the ventral area, confirmed negative in the right inguinal area and confirmed negative in the left inguinal area.  Musculoskeletal:      C unspecified type    Diverticulosis of large intestine without hemorrhage    Hyperglycemia    Osteoporosis screening  -     XR DEXA BONE DENSITOMETRY (CPT=77080); Future    Hypovitaminosis D  -     VITAMIN D, 25-HYDROXY         Diet assessment: good   1.  Me hypercholesterolemia  Doing well  CPM  Lab: Lipids, CMP  Follow up [facility]      3. Prediabetes  Lab; A1c      4. Elevated LFTs  LAb: CMP    5. Erectile dysfunction, unspecified erectile dysfunction type  Doing well  CPM  Lab:PSA  Follow up [facility]      6.  Benign p 03/16/2019 97          Cardiovascular Disease Screening     LDL Annually LDL Cholesterol (mg/dL)   Date Value   03/16/2019 77        EKG - w/ Initial Preventative Physical Exam only, or if medically necessary Electrocardiogram date03/16/2019    Marlenea

## 2020-08-05 ENCOUNTER — HOSPITAL ENCOUNTER (OUTPATIENT)
Dept: BONE DENSITY | Age: 75
Discharge: HOME | End: 2020-08-05
Attending: FAMILY MEDICINE
Payer: MEDICARE

## 2020-08-05 DIAGNOSIS — Z13.820 OSTEOPOROSIS SCREENING: ICD-10-CM

## 2020-08-05 PROCEDURE — 77080 DXA BONE DENSITY AXIAL: CPT | Performed by: FAMILY MEDICINE

## 2020-08-18 ENCOUNTER — NURSE TRIAGE (OUTPATIENT)
Dept: FAMILY MEDICINE CLINIC | Facility: CLINIC | Age: 75
End: 2020-08-18

## 2020-08-18 DIAGNOSIS — R31.9 HEMATURIA, UNSPECIFIED TYPE: Primary | ICD-10-CM

## 2020-08-18 NOTE — TELEPHONE ENCOUNTER
Pt was called and informed of  message below. Pt was given the information below and he was transfer to the urologist office to schedule the appt.       Referred to Provider Information:  Provider Address Phone   Jose Rm MD [address]

## 2020-08-18 NOTE — TELEPHONE ENCOUNTER
Action Requested: Summary for Provider     []  Critical Lab, Recommendations Needed  [x] Need Additional Advice  []   FYI    []   Need Orders  [] Need Medications Sent to Pharmacy  []  Other     SUMMARY: Patient requesting appt, reports yesterday had few d

## 2020-09-28 ENCOUNTER — OFFICE VISIT (OUTPATIENT)
Dept: SURGERY | Facility: CLINIC | Age: 75
End: 2020-09-28
Payer: MEDICARE

## 2020-09-28 VITALS
DIASTOLIC BLOOD PRESSURE: 83 MMHG | WEIGHT: 211 LBS | HEART RATE: 51 BPM | SYSTOLIC BLOOD PRESSURE: 169 MMHG | BODY MASS INDEX: 29 KG/M2

## 2020-09-28 DIAGNOSIS — N52.9 ERECTILE DYSFUNCTION, UNSPECIFIED ERECTILE DYSFUNCTION TYPE: ICD-10-CM

## 2020-09-28 DIAGNOSIS — R31.29 MICROHEMATURIA: Primary | ICD-10-CM

## 2020-09-28 DIAGNOSIS — R39.198 SLOW URINARY STREAM: ICD-10-CM

## 2020-09-28 PROCEDURE — 3079F DIAST BP 80-89 MM HG: CPT | Performed by: UROLOGY

## 2020-09-28 PROCEDURE — 3077F SYST BP >= 140 MM HG: CPT | Performed by: UROLOGY

## 2020-09-28 PROCEDURE — 99214 OFFICE O/P EST MOD 30 MIN: CPT | Performed by: UROLOGY

## 2020-09-28 RX ORDER — CEFDINIR 300 MG/1
300 CAPSULE ORAL EVERY 12 HOURS
Qty: 6 CAPSULE | Refills: 0 | Status: SHIPPED | OUTPATIENT
Start: 2020-09-28 | End: 2020-10-01

## 2020-09-28 RX ORDER — CIPROFLOXACIN 500 MG/1
500 TABLET, FILM COATED ORAL 2 TIMES DAILY
Qty: 6 TABLET | Refills: 0 | Status: ON HOLD | OUTPATIENT
Start: 2020-09-28 | End: 2021-03-01

## 2020-09-28 NOTE — PROGRESS NOTES
Royal Montelongo is a 70year old male. HPI:   Patient presents with: Other: patient presents for blood in semen on 1 occasion no pain       62-year-old male presents in follow-up to visit December 9, 2019.   He had seen me at that time for lower urinary No       Medications (Active prior to today's visit):  Current Outpatient Medications   Medication Sig Dispense Refill   • Sildenafil Citrate 100 MG Oral Tab Take 1 tablet (100 mg total) by mouth daily as needed for Erectile Dysfunction.  (Patient not takin encounter.       Meds This Visit:  Requested Prescriptions      No prescriptions requested or ordered in this encounter       Imaging & Referrals:  None     9/28/2020  Gaudencio French MD

## 2020-10-29 ENCOUNTER — TELEPHONE (OUTPATIENT)
Dept: SURGERY | Facility: CLINIC | Age: 75
End: 2020-10-29

## 2020-10-29 NOTE — TELEPHONE ENCOUNTER
pt. has questions about the saline enema. Pt. Is not sure how much to use before his proc on 11/3/2020? Pt.  Wants to know if he should use the med at home or when he arrives to the hospital?

## 2020-11-03 ENCOUNTER — APPOINTMENT (OUTPATIENT)
Dept: LAB | Facility: HOSPITAL | Age: 75
End: 2020-11-03
Attending: UROLOGY
Payer: MEDICARE

## 2020-11-03 ENCOUNTER — PROCEDURE (OUTPATIENT)
Dept: SURGERY | Facility: CLINIC | Age: 75
End: 2020-11-03
Payer: MEDICARE

## 2020-11-03 DIAGNOSIS — R31.29 MICROHEMATURIA: ICD-10-CM

## 2020-11-03 DIAGNOSIS — N40.2 NODULAR PROSTATE: ICD-10-CM

## 2020-11-03 DIAGNOSIS — R97.20 ELEVATED PSA: Primary | ICD-10-CM

## 2020-11-03 DIAGNOSIS — R39.198 SLOW URINARY STREAM: ICD-10-CM

## 2020-11-03 PROCEDURE — 85025 COMPLETE CBC W/AUTO DIFF WBC: CPT | Performed by: UROLOGY

## 2020-11-03 PROCEDURE — 99213 OFFICE O/P EST LOW 20 MIN: CPT | Performed by: UROLOGY

## 2020-11-03 PROCEDURE — 36415 COLL VENOUS BLD VENIPUNCTURE: CPT | Performed by: UROLOGY

## 2020-11-03 RX ORDER — SILDENAFIL 100 MG/1
TABLET, FILM COATED ORAL
Qty: 13 TABLET | Refills: 3 | OUTPATIENT
Start: 2020-11-03

## 2020-11-03 RX ORDER — CIPROFLOXACIN 500 MG/1
500 TABLET, FILM COATED ORAL 2 TIMES DAILY
Qty: 3 TABLET | Refills: 0 | Status: SHIPPED | OUTPATIENT
Start: 2020-11-03 | End: 2020-11-05

## 2020-11-03 RX ORDER — CEFDINIR 300 MG/1
300 CAPSULE ORAL EVERY 12 HOURS
Qty: 3 CAPSULE | Refills: 0 | Status: SHIPPED | OUTPATIENT
Start: 2020-11-03 | End: 2020-11-05

## 2020-11-03 RX ORDER — SILDENAFIL 100 MG/1
100 TABLET, FILM COATED ORAL
Qty: 7 TABLET | Refills: 11 | Status: SHIPPED | OUTPATIENT
Start: 2020-11-03 | End: 2020-11-11

## 2020-11-03 NOTE — PROGRESS NOTES
Stephany Bird is a 70year old male. HPI:   Patient presents with:  Procedure: PT presents for prostate biopsy. 53-year-old male scheduled today for transrectal ultrasound and prostate biopsy in follow-up to visit September 28, 2020.   He initial Use      Smoking status: Former Smoker      Smokeless tobacco: Never Used    Alcohol use: Yes      Alcohol/week: 0.0 standard drinks      Comment: drinks alcohol on a social basis only.     Drug use: No       Medications (Active prior to today's visit):  Cu Omnicef and ciprofloxacin in the office at the ones he took prior to his appointment today. [facility] office will schedule him for transrectal ultrasound of the prostate and biopsy under anesthetic.          Orders This Visit:  No orders of the defined types wer

## 2020-11-03 NOTE — PROGRESS NOTES
Patient seen in office, scheduled Transrectal Us of the prostate and biopsy, [facility]/outpatient, went over pre-biopsy instructions, labs done today, all questions answered, patient verbalized understanding.

## 2020-11-11 ENCOUNTER — APPOINTMENT (OUTPATIENT)
Dept: LAB | Age: 75
End: 2020-11-11
Attending: UROLOGY
Payer: MEDICARE

## 2020-11-11 DIAGNOSIS — Z01.818 PREOP TESTING: ICD-10-CM

## 2020-11-11 RX ORDER — CEFDINIR 300 MG/1
300 CAPSULE ORAL 2 TIMES DAILY
Status: ON HOLD | COMMUNITY
End: 2021-03-01

## 2020-11-12 ENCOUNTER — APPOINTMENT (OUTPATIENT)
Dept: ULTRASOUND IMAGING | Facility: HOSPITAL | Age: 75
End: 2020-11-12
Attending: UROLOGY
Payer: MEDICARE

## 2020-11-12 ENCOUNTER — ANESTHESIA EVENT (OUTPATIENT)
Dept: SURGERY | Facility: HOSPITAL | Age: 75
End: 2020-11-12
Payer: MEDICARE

## 2020-11-12 ENCOUNTER — HOSPITAL ENCOUNTER (OUTPATIENT)
Facility: HOSPITAL | Age: 75
Setting detail: HOSPITAL OUTPATIENT SURGERY
Discharge: HOME | End: 2020-11-12
Attending: UROLOGY | Admitting: UROLOGY
Payer: MEDICARE

## 2020-11-12 ENCOUNTER — ANESTHESIA (OUTPATIENT)
Dept: SURGERY | Facility: HOSPITAL | Age: 75
End: 2020-11-12
Payer: MEDICARE

## 2020-11-12 VITALS
HEART RATE: 66 BPM | RESPIRATION RATE: 17 BRPM | TEMPERATURE: 97 F | DIASTOLIC BLOOD PRESSURE: 62 MMHG | WEIGHT: 214 LBS | BODY MASS INDEX: 28.99 KG/M2 | OXYGEN SATURATION: 98 % | HEIGHT: 72 IN | SYSTOLIC BLOOD PRESSURE: 145 MMHG

## 2020-11-12 DIAGNOSIS — Z01.818 PREOP TESTING: Primary | ICD-10-CM

## 2020-11-12 DIAGNOSIS — N40.2 NODULAR PROSTATE: ICD-10-CM

## 2020-11-12 DIAGNOSIS — R97.20 ELEVATED PSA: ICD-10-CM

## 2020-11-12 PROCEDURE — 76872 US TRANSRECTAL: CPT | Performed by: UROLOGY

## 2020-11-12 PROCEDURE — BV49ZZZ ULTRASONOGRAPHY OF PROSTATE AND SEMINAL VESICLES: ICD-10-PCS | Performed by: UROLOGY

## 2020-11-12 PROCEDURE — 0VB03ZX EXCISION OF PROSTATE, PERCUTANEOUS APPROACH, DIAGNOSTIC: ICD-10-PCS | Performed by: UROLOGY

## 2020-11-12 PROCEDURE — 55700 BIOPSY OF PROSTATE,NEEDLE/PUNCH: CPT | Performed by: UROLOGY

## 2020-11-12 PROCEDURE — 76998 US GUIDE INTRAOP: CPT | Performed by: UROLOGY

## 2020-11-12 RX ORDER — NALOXONE HYDROCHLORIDE 0.4 MG/ML
80 INJECTION, SOLUTION INTRAMUSCULAR; INTRAVENOUS; SUBCUTANEOUS AS NEEDED
Status: CANCELLED | OUTPATIENT
Start: 2020-11-12 | End: 2020-11-13

## 2020-11-12 RX ORDER — SODIUM CHLORIDE, SODIUM LACTATE, POTASSIUM CHLORIDE, CALCIUM CHLORIDE 600; 310; 30; 20 MG/100ML; MG/100ML; MG/100ML; MG/100ML
INJECTION, SOLUTION INTRAVENOUS CONTINUOUS
Status: CANCELLED | OUTPATIENT
Start: 2020-11-12

## 2020-11-12 RX ORDER — ONDANSETRON 2 MG/ML
INJECTION INTRAMUSCULAR; INTRAVENOUS AS NEEDED
Status: DISCONTINUED | OUTPATIENT
Start: 2020-11-12 | End: 2020-11-12 | Stop reason: SURG

## 2020-11-12 RX ORDER — LIDOCAINE HYDROCHLORIDE 10 MG/ML
INJECTION, SOLUTION EPIDURAL; INFILTRATION; INTRACAUDAL; PERINEURAL AS NEEDED
Status: DISCONTINUED | OUTPATIENT
Start: 2020-11-12 | End: 2020-11-12 | Stop reason: SURG

## 2020-11-12 RX ORDER — MORPHINE SULFATE 2 MG/ML
2 INJECTION, SOLUTION INTRAMUSCULAR; INTRAVENOUS EVERY 10 MIN PRN
Status: CANCELLED | OUTPATIENT
Start: 2020-11-12

## 2020-11-12 RX ORDER — HYDROCODONE BITARTRATE AND ACETAMINOPHEN 5; 325 MG/1; MG/1
1 TABLET ORAL AS NEEDED
Status: CANCELLED | OUTPATIENT
Start: 2020-11-12

## 2020-11-12 RX ORDER — HYDROCODONE BITARTRATE AND ACETAMINOPHEN 5; 325 MG/1; MG/1
2 TABLET ORAL AS NEEDED
Status: CANCELLED | OUTPATIENT
Start: 2020-11-12

## 2020-11-12 RX ORDER — ACETAMINOPHEN 500 MG
1000 TABLET ORAL ONCE
Status: COMPLETED | OUTPATIENT
Start: 2020-11-12 | End: 2020-11-12

## 2020-11-12 RX ORDER — EPHEDRINE SULFATE 50 MG/ML
INJECTION, SOLUTION INTRAVENOUS AS NEEDED
Status: DISCONTINUED | OUTPATIENT
Start: 2020-11-12 | End: 2020-11-12 | Stop reason: SURG

## 2020-11-12 RX ORDER — DEXAMETHASONE SODIUM PHOSPHATE 4 MG/ML
VIAL (ML) INJECTION AS NEEDED
Status: DISCONTINUED | OUTPATIENT
Start: 2020-11-12 | End: 2020-11-12 | Stop reason: SURG

## 2020-11-12 RX ORDER — SODIUM CHLORIDE, SODIUM LACTATE, POTASSIUM CHLORIDE, CALCIUM CHLORIDE 600; 310; 30; 20 MG/100ML; MG/100ML; MG/100ML; MG/100ML
INJECTION, SOLUTION INTRAVENOUS CONTINUOUS
Status: DISCONTINUED | OUTPATIENT
Start: 2020-11-12 | End: 2020-11-12

## 2020-11-12 RX ORDER — HALOPERIDOL 5 MG/ML
0.25 INJECTION INTRAMUSCULAR ONCE AS NEEDED
Status: CANCELLED | OUTPATIENT
Start: 2020-11-12 | End: 2020-11-12

## 2020-11-12 RX ORDER — PROCHLORPERAZINE EDISYLATE 5 MG/ML
5 INJECTION INTRAMUSCULAR; INTRAVENOUS ONCE AS NEEDED
Status: CANCELLED | OUTPATIENT
Start: 2020-11-12 | End: 2020-11-12

## 2020-11-12 RX ORDER — HYDROMORPHONE HYDROCHLORIDE 1 MG/ML
0.2 INJECTION, SOLUTION INTRAMUSCULAR; INTRAVENOUS; SUBCUTANEOUS EVERY 5 MIN PRN
Status: CANCELLED | OUTPATIENT
Start: 2020-11-12

## 2020-11-12 RX ADMIN — ONDANSETRON 4 MG: 2 INJECTION INTRAMUSCULAR; INTRAVENOUS at 15:04:00

## 2020-11-12 RX ADMIN — EPHEDRINE SULFATE 5 MG: 50 INJECTION, SOLUTION INTRAVENOUS at 15:00:00

## 2020-11-12 RX ADMIN — EPHEDRINE SULFATE 5 MG: 50 INJECTION, SOLUTION INTRAVENOUS at 15:10:00

## 2020-11-12 RX ADMIN — LIDOCAINE HYDROCHLORIDE 50 MG: 10 INJECTION, SOLUTION EPIDURAL; INFILTRATION; INTRACAUDAL; PERINEURAL at 14:55:00

## 2020-11-12 RX ADMIN — DEXAMETHASONE SODIUM PHOSPHATE 4 MG: 4 MG/ML VIAL (ML) INJECTION at 15:00:00

## 2020-11-12 NOTE — ANESTHESIA PREPROCEDURE EVALUATION
Anesthesia PreOp Note    HPI:     Marie Romero is a 70year old male who presents for preoperative consultation requested by:  Nima Barrera MD    Date of Surgery: 11/12/2020    Procedure(s):  CYSTOSCOPY TRANSURETHRAL RESECTION PROSTATE  Indication: El Disp: 3 tablet, Rfl: 5, Unknown at Unknown time    •  Meclizine HCl 12.5 MG Oral Tab, Take 1 tablet (12.5 mg total) by mouth 3 (three) times daily as needed.  (Patient not taking: Reported on 6/16/2020 ), Disp: 60 tablet, Rfl: 2, Unknown at Unknown time Relationship status: Not on file      Intimate partner violence        Fear of current or ex partner: Not on file        Emotionally abused: Not on file        Physically abused: Not on file        Forced sexual activity: Not on file    Other Topics      C anesthetic complications   Airway   Mallampati: II  TM distance: >3 FB  Neck ROM: full  Dental - normal exam     Pulmonary - negative ROS    breath sounds clear to auscultation  (-) shortness of breath, recent URI, wheezes, rales, stridor  Cardiovascular -

## 2020-11-12 NOTE — H&P
68-year-old male scheduled today for transrectal ultrasound and prostate biopsy in follow-up to visit September 28, 2020. He initially presented for lower urinary tract symptoms. Cystoscopy demonstrated a minimal bulbar urethral stricture and BPH.   Start Alcohol use: Yes      Alcohol/week: 0.0 standard drinks      Comment: drinks alcohol on a social basis only. Drug use:  No         Medications (Active prior to today's visit):         Current Outpatient Medications   Medication Sig Dispense Refill   • ce urinary stream     Recommended:  –Refilled his sildenafil prescription as per his wishes. –Provided him with 6 additional tablets of Omnicef and ciprofloxacin in the office at the ones he took prior to his appointment today.   [facility] office will schedule him

## 2020-11-12 NOTE — ANESTHESIA PROCEDURE NOTES
Airway  Urgency: Elective      General Information and Staff    Patient location during procedure: OR  Anesthesiologist: Gita Sandoval DO  Performed: anesthesiologist     Indications and Patient Condition  Indications for airway management: anesthe

## 2020-11-12 NOTE — ANESTHESIA POSTPROCEDURE EVALUATION
Patient: Harmony Swenson    Procedure Summary     Date: 11/12/20 Room / Location: [facility] OR  / [facility] OR    Anesthesia Start: 5402 Anesthesia Stop: 5122    Procedure: CYSTOSCOPY TRANSURETHRAL RESECTION PROSTATE (N/A ) Diagnosis:       Elevated PSA

## 2020-11-12 NOTE — INTERVAL H&P NOTE
Pre-op Diagnosis: Elevated PSA [R97.20]  Nodular prostate [N40.2]    The above referenced H&P was reviewed by Klever Mendoza MD on 11/12/2020, the patient was examined and no significant changes have occurred in the patient's condition since the H&P was pe

## 2020-11-12 NOTE — OPERATIVE REPORT
[facility] - [facility]    Operative Note     Nohemy Cummins Location: OR   [facility] [id number] MRN [id number]   Admission Date 11/12/2020 Operation Date 11/12/2020   Attending Physician Laura Jackson MD Operating Physician Jose M Guerrero MD      Preoperat

## 2020-11-13 ENCOUNTER — TELEPHONE (OUTPATIENT)
Dept: SURGERY | Facility: CLINIC | Age: 75
End: 2020-11-13

## 2020-11-13 RX ORDER — CEFDINIR 300 MG/1
300 CAPSULE ORAL EVERY 12 HOURS
Qty: 3 CAPSULE | Refills: 0 | Status: SHIPPED | OUTPATIENT
Start: 2020-11-13 | End: 2020-11-16

## 2020-11-13 RX ORDER — CIPROFLOXACIN 500 MG/1
TABLET, FILM COATED ORAL
Qty: 3 TABLET | Refills: 0 | Status: ON HOLD | OUTPATIENT
Start: 2020-11-13 | End: 2021-03-01

## 2020-11-13 NOTE — TELEPHONE ENCOUNTER
[facility]/pharmacy calling for script refill for new script for prostate, please contact at:[phone],thanks.   *does not know rx name  *patient at pharmacy now

## 2020-11-13 NOTE — TELEPHONE ENCOUNTER
S/W Effie Fiore the pharmacy tech at [facility] and she informed that pt is at the pharmacy asking for a script for medication that was not sent.  I reviewed RU's notes from his H&P that he wrote yesterday prior to pt's TRUS with prostate bx's at the hospital yest

## 2020-11-14 ENCOUNTER — TELEPHONE (OUTPATIENT)
Dept: SURGERY | Facility: CLINIC | Age: 75
End: 2020-11-14

## 2020-11-14 RX ORDER — TAMSULOSIN HYDROCHLORIDE 0.4 MG/1
CAPSULE ORAL
Qty: 30 CAPSULE | Refills: 0 | Status: SHIPPED | OUTPATIENT
Start: 2020-11-14 | End: 2023-09-18

## 2020-11-14 NOTE — TELEPHONE ENCOUNTER
Covering Dr. Becky Hammonds weekend    Patient calls complaining of nocturia several times during the night; although he states that he does drink 2, sometimes 3 glasses of water and sometimes a cup of tea in the 3 hours before bedtime, he states that it is presently much worse than 1 month ago when he was drinking the same amount of liquids. He denies any fevers or chills or dysuria. Finish prescribed 3-day course of ciprofloxacin and cefdinir as part of 11/12/2020 Dr. Becky Hammonds prostate biopsy at hospital under general anesthesia. Patient states he had discussed starting medication for his bothersome nocturia and he believes it was tamsulosin. Patient wants me to prescribe it to him this weekend. The patient on meclizine, he denies any dizziness at present. I agreed to order 30-day supply, however, warned patient that tamsulosin can cause lightheadedness or dizziness upon standing up and if that occurs, to stop the medication immediately. If medication helps him, I asked patient to get further refills for tamsulosin from Dr. Becky Hammonds. Answered all patient's questions and he understood. Medication electronically ordered.   Dr. Joni Christianson

## 2020-11-16 ENCOUNTER — TELEPHONE (OUTPATIENT)
Dept: SURGERY | Facility: CLINIC | Age: 75
End: 2020-11-16

## 2020-11-16 ENCOUNTER — TELEPHONE (OUTPATIENT)
Dept: FAMILY MEDICINE CLINIC | Facility: CLINIC | Age: 75
End: 2020-11-16

## 2020-11-16 NOTE — TELEPHONE ENCOUNTER
Orders are in the system already they were ordered by Dr Rossi Rosales.    1. NM bone scan WB  2. CT abdomen & pelvis

## 2020-11-16 NOTE — TELEPHONE ENCOUNTER
----- Message from Valente Moe MD sent at 11/16/2020  9:37 AM CST -----  I called and spoke with the patient. 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 Patient is the uncle of Dr. Jose Enrique Campbell. Pt is aware of pathology results.

## 2020-11-16 NOTE — TELEPHONE ENCOUNTER
Patient contacted. Relayed Dr. Mansi Arrieta message below to the patient. Number to Central Scheduling given to patient. Next, offered appt on Tues 12/1 @ 4:00 pm.  Patient verbalized understanding. All questions answered.

## 2020-11-16 NOTE — TELEPHONE ENCOUNTER
1st attempt/left message for Sridhar Oneil to call us back. When Sridhar Oneil returns the call please advise her of the message below.

## 2020-11-25 ENCOUNTER — HOSPITAL ENCOUNTER (OUTPATIENT)
Dept: CT IMAGING | Facility: HOSPITAL | Age: 75
Discharge: HOME | End: 2020-11-25
Attending: UROLOGY
Payer: MEDICARE

## 2020-11-25 ENCOUNTER — HOSPITAL ENCOUNTER (OUTPATIENT)
Dept: NUCLEAR MEDICINE | Facility: HOSPITAL | Age: 75
Discharge: HOME | End: 2020-11-25
Attending: UROLOGY
Payer: MEDICARE

## 2020-11-25 DIAGNOSIS — C61 PROSTATE CANCER (HCC): ICD-10-CM

## 2020-11-25 PROCEDURE — 74178 CT ABD&PLV WO CNTR FLWD CNTR: CPT | Performed by: UROLOGY

## 2020-11-25 PROCEDURE — 82565 ASSAY OF CREATININE: CPT

## 2020-11-25 PROCEDURE — 78306 BONE IMAGING WHOLE BODY: CPT | Performed by: UROLOGY

## 2020-12-01 ENCOUNTER — OFFICE VISIT (OUTPATIENT)
Dept: SURGERY | Facility: CLINIC | Age: 75
End: 2020-12-01
Payer: MEDICARE

## 2020-12-01 VITALS
HEART RATE: 58 BPM | DIASTOLIC BLOOD PRESSURE: 80 MMHG | WEIGHT: 210 LBS | HEIGHT: 72 IN | SYSTOLIC BLOOD PRESSURE: 151 MMHG | BODY MASS INDEX: 28.44 KG/M2

## 2020-12-01 DIAGNOSIS — R97.20 ELEVATED PSA: Primary | ICD-10-CM

## 2020-12-01 DIAGNOSIS — N40.2 NODULAR PROSTATE: ICD-10-CM

## 2020-12-01 DIAGNOSIS — R39.198 SLOW URINARY STREAM: ICD-10-CM

## 2020-12-01 DIAGNOSIS — N52.9 ERECTILE DYSFUNCTION, UNSPECIFIED ERECTILE DYSFUNCTION TYPE: ICD-10-CM

## 2020-12-01 PROCEDURE — 3077F SYST BP >= 140 MM HG: CPT | Performed by: UROLOGY

## 2020-12-01 PROCEDURE — 99215 OFFICE O/P EST HI 40 MIN: CPT | Performed by: UROLOGY

## 2020-12-01 PROCEDURE — 3008F BODY MASS INDEX DOCD: CPT | Performed by: UROLOGY

## 2020-12-01 PROCEDURE — 3079F DIAST BP 80-89 MM HG: CPT | Performed by: UROLOGY

## 2020-12-16 NOTE — PROGRESS NOTES
Tash Perrin is a 70year old male. HPI:   Patient presents with: Other: Pt presents for office visit to discusslab results. 73-year-old male in follow-up to visit November 3, 2020.   Patient is status post transrectal ultrasound and prostate bi Miquel Gomez MD at [facility] OR   • TONSILLECTOMY     • VASECTOMY        Family History   Problem Relation Age of Onset   • Dementia Mother         Alzheimer's   • Hypertension Brother    • Lipids Brother         hyperlipidemia   • Diabetes Brother         type surveillance, definitive treatment with prostatectomy usually performed with robotic/laparoscopic assistance, radiation therapy either in the form of external beam radiation therapy or radioactive seed implant.   We discussed the benefits and drawbacks of e 45 minutes discussing these treatment options with the patient, answering questions well over half the time spent in face-to-face discussion.     In the end, I suggested a combination of androgen deprivation therapy for 18 to 24 months in combination with e

## 2021-03-01 ENCOUNTER — APPOINTMENT (OUTPATIENT)
Dept: CV DIAGNOSTICS | Facility: HOSPITAL | Age: 76
DRG: 312 | End: 2021-03-01
Attending: HOSPITALIST
Payer: MEDICARE

## 2021-03-01 ENCOUNTER — HOSPITAL ENCOUNTER (INPATIENT)
Facility: HOSPITAL | Age: 76
LOS: 1 days | Discharge: HOME | DRG: 312 | End: 2021-03-01
Attending: EMERGENCY MEDICINE | Admitting: HOSPITALIST
Payer: MEDICARE

## 2021-03-01 ENCOUNTER — APPOINTMENT (OUTPATIENT)
Dept: ULTRASOUND IMAGING | Facility: HOSPITAL | Age: 76
DRG: 312 | End: 2021-03-01
Attending: HOSPITALIST
Payer: MEDICARE

## 2021-03-01 VITALS
SYSTOLIC BLOOD PRESSURE: 140 MMHG | HEART RATE: 52 BPM | DIASTOLIC BLOOD PRESSURE: 78 MMHG | BODY MASS INDEX: 28.81 KG/M2 | WEIGHT: 212.69 LBS | HEIGHT: 72 IN | RESPIRATION RATE: 20 BRPM | TEMPERATURE: 99 F | OXYGEN SATURATION: 98 %

## 2021-03-01 DIAGNOSIS — R00.1 SINUS BRADYCARDIA: ICD-10-CM

## 2021-03-01 DIAGNOSIS — R55 SYNCOPE AND COLLAPSE: Primary | ICD-10-CM

## 2021-03-01 LAB
ANION GAP SERPL CALC-SCNC: 4 MMOL/L (ref 0–18)
BASOPHILS # BLD AUTO: 0.03 X10(3) UL (ref 0–0.2)
BASOPHILS NFR BLD AUTO: 0.5 %
BUN BLD-MCNC: 31 MG/DL (ref 7–18)
BUN/CREAT SERPL: 21.8 (ref 10–20)
CALCIUM BLD-MCNC: 8.6 MG/DL (ref 8.5–10.1)
CHLORIDE SERPL-SCNC: 107 MMOL/L (ref 98–112)
CO2 SERPL-SCNC: 30 MMOL/L (ref 21–32)
CREAT BLD-MCNC: 1.42 MG/DL
D DIMER PPP FEU-MCNC: 0.33 UG/ML FEU (ref ?–0.74)
DEPRECATED RDW RBC AUTO: 41.7 FL (ref 35.1–46.3)
EOSINOPHIL # BLD AUTO: 0.11 X10(3) UL (ref 0–0.7)
EOSINOPHIL NFR BLD AUTO: 1.7 %
ERYTHROCYTE [DISTWIDTH] IN BLOOD BY AUTOMATED COUNT: 12.8 % (ref 11–15)
GLUCOSE BLD-MCNC: 131 MG/DL (ref 70–99)
GLUCOSE BLDC GLUCOMTR-MCNC: 121 MG/DL (ref 70–99)
HCT VFR BLD AUTO: 38.9 %
HGB BLD-MCNC: 13.5 G/DL
IMM GRANULOCYTES # BLD AUTO: 0.04 X10(3) UL (ref 0–1)
IMM GRANULOCYTES NFR BLD: 0.6 %
LYMPHOCYTES # BLD AUTO: 2.51 X10(3) UL (ref 1–4)
LYMPHOCYTES NFR BLD AUTO: 39.5 %
MCH RBC QN AUTO: 30.9 PG (ref 26–34)
MCHC RBC AUTO-ENTMCNC: 34.7 G/DL (ref 31–37)
MCV RBC AUTO: 89 FL
MONOCYTES # BLD AUTO: 0.54 X10(3) UL (ref 0.1–1)
MONOCYTES NFR BLD AUTO: 8.5 %
NEUTROPHILS # BLD AUTO: 3.12 X10 (3) UL (ref 1.5–7.7)
NEUTROPHILS # BLD AUTO: 3.12 X10(3) UL (ref 1.5–7.7)
NEUTROPHILS NFR BLD AUTO: 49.2 %
OSMOLALITY SERPL CALC.SUM OF ELEC: 300 MOSM/KG (ref 275–295)
PLATELET # BLD AUTO: 190 10(3)UL (ref 150–450)
POTASSIUM SERPL-SCNC: 3.8 MMOL/L (ref 3.5–5.1)
RBC # BLD AUTO: 4.37 X10(6)UL
SARS-COV-2 RNA RESP QL NAA+PROBE: NOT DETECTED
SODIUM SERPL-SCNC: 141 MMOL/L (ref 136–145)
TROPONIN I SERPL-MCNC: <0.045 NG/ML (ref ?–0.04)
WBC # BLD AUTO: 6.4 X10(3) UL (ref 4–11)

## 2021-03-01 PROCEDURE — 93306 TTE W/DOPPLER COMPLETE: CPT | Performed by: HOSPITALIST

## 2021-03-01 PROCEDURE — 99222 1ST HOSP IP/OBS MODERATE 55: CPT | Performed by: HOSPITALIST

## 2021-03-01 PROCEDURE — 3078F DIAST BP <80 MM HG: CPT | Performed by: HOSPITALIST

## 2021-03-01 PROCEDURE — 3008F BODY MASS INDEX DOCD: CPT | Performed by: HOSPITALIST

## 2021-03-01 PROCEDURE — 93880 EXTRACRANIAL BILAT STUDY: CPT | Performed by: HOSPITALIST

## 2021-03-01 PROCEDURE — 3077F SYST BP >= 140 MM HG: CPT | Performed by: HOSPITALIST

## 2021-03-01 RX ORDER — PANTOPRAZOLE SODIUM 40 MG/1
40 TABLET, DELAYED RELEASE ORAL
Status: DISCONTINUED | OUTPATIENT
Start: 2021-03-01 | End: 2021-03-01

## 2021-03-01 RX ORDER — ONDANSETRON 2 MG/ML
4 INJECTION INTRAMUSCULAR; INTRAVENOUS EVERY 6 HOURS PRN
Status: DISCONTINUED | OUTPATIENT
Start: 2021-03-01 | End: 2021-03-01

## 2021-03-01 RX ORDER — ABIRATERONE ACETATE 250 MG/1
1000 TABLET ORAL DAILY
Status: DISCONTINUED | OUTPATIENT
Start: 2021-03-01 | End: 2021-03-01

## 2021-03-01 RX ORDER — ACETAMINOPHEN 325 MG/1
650 TABLET ORAL EVERY 6 HOURS PRN
Status: DISCONTINUED | OUTPATIENT
Start: 2021-03-01 | End: 2021-03-01

## 2021-03-01 RX ORDER — HEPARIN SODIUM 5000 [USP'U]/ML
5000 INJECTION, SOLUTION INTRAVENOUS; SUBCUTANEOUS EVERY 12 HOURS SCHEDULED
Status: DISCONTINUED | OUTPATIENT
Start: 2021-03-01 | End: 2021-03-01

## 2021-03-01 RX ORDER — SODIUM CHLORIDE 9 MG/ML
INJECTION, SOLUTION INTRAVENOUS CONTINUOUS
Status: DISCONTINUED | OUTPATIENT
Start: 2021-03-01 | End: 2021-03-01

## 2021-03-01 RX ORDER — ABIRATERONE ACETATE 250 MG/1
250 TABLET ORAL 4 TIMES DAILY
COMMUNITY

## 2021-03-01 NOTE — ED NOTES
Patients son at bedside. Patient did take a cannabis edible today which is new to him,. Unsure of dose. Recently diagnosed with prostate CA.

## 2021-03-01 NOTE — CONSULTS
[facility] - [facility]    Cardiology Consultation  [facility]    Gail Shook Patient Status:  Inpatient    1945 MRN [id number]   Location [facility] 3W/SW Attending Péerz Mckeon MD   Hosp Day # 0 ISSAC OSORIO • Hypertension Brother    • Lipids Brother         hyperlipidemia   • Diabetes Brother         type 2   • Diabetes Sister         type 2     Social History  Patient Guardians:  Not on file    Other Topics            Concern  Caffeine Concern        Yes ROS  10 point review of systems completed and negative except as noted.     Physical Exam:     Patient Vitals for the past 24 hrs:   BP Temp Temp src Pulse Resp SpO2 Height Weight   03/01/21 0801 142/88 97.7 °F (36.5 °C) Oral 55 18 95 % — —   03/01/21 0 Without clubbing, cyanosis or edema. Neurologic: Alert and oriented, normal affect. No focal defects  Skin: Warm and dry.      Results:     Laboratory Data:  Lab Results   Component Value Date    WBC 6.4 03/01/2021    HGB 13.5 03/01/2021    HCT 38.9 (L) 03

## 2021-03-01 NOTE — ED NOTES
Patient reports feeling dizzy when he was in the bathroom. States the dizziness is resolving at this time. HX of vertigo. Denies chest pain or SOB.

## 2021-03-01 NOTE — ED PROVIDER NOTES
Patient Seen in: [facility] AND [facility] Emergency Department      History   Patient presents with:  Syncope    Stated Complaint: syncope    HPI/Subjective:   HPI    62-year-old male with history of prostate cancer, hyperlipidemia, here after syncopal episode of Systems   Constitutional: Negative for fever. HENT: Negative for congestion. Eyes: Negative for visual disturbance. Respiratory: Negative for shortness of breath. Cardiovascular: Negative for chest pain.    Gastrointestinal: Negative for abdomi EKG    Rate, intervals and axes as noted on EKG Report.   Rate: 47  Rhythm: Sinus Rhythm  Reading: abnormal for rate, normal for rhythm, no acute ST changes    Cardiac Monitor:    Patient placed on the cardiac monitor and a rhythm strip obtained with th Granulocyte Absolute 0.04 0.00 - 1.00 x10(3) uL    Neutrophil % 49.2 %    Lymphocyte % 39.5 %    Monocyte % 8.5 %    Eosinophil % 1.7 %    Basophil % 0.5 %    Immature Granulocyte % 0.6 %   D-DIMER    Collection Time: 03/01/21  1:10 AM   Result Value Ref R (primary encounter diagnosis)  Sinus bradycardia    Disposition:  Admit  3/1/2021  2:13 am    Follow-up:  No follow-up provider specified.   We recommend that you schedule follow up care with a primary care provider within the next three months to obtain ba

## 2021-03-01 NOTE — DISCHARGE SUMMARY
[facility]    Discharge Summary    Sincere Ramirez Patient Status:  Inpatient    1945 MRN [id number]   Location [facility] 3W/SW Attending Roosvelt Habermann, MD   Hosp Day # 0 PCP Dat White MD     Date of Admission: 3/ however does not remember hitting the ground, no obvious injuries however. He denies any preceding headache, chest pain palpitations focal numbness weakness or tingling. Hospital Course:   Syncope  Appears vasovagal  - 2D echo- EF 55-60%.  Dilated ascen times daily as needed. Quantity: 60 tablet  Refills: 2     prednisoLONE 5 MG Tabs  Commonly known as: ORAPRED      Take 5 mg by mouth daily.    Refills: 0     Sildenafil Citrate 100 MG Tabs  Commonly known as: Viagra      Take 1 tablet (100 mg total) by m

## 2021-03-01 NOTE — ED NOTES
Orders for admission, patient is aware of plan and ready to go upstairs.  Any questions, please call ED SELENA leiva  at extension [phone]  Type of COVID test sent:rapid  COVID Suspicion level: Low    Titratable drug(s) infusing:  Rate:none    LOC at time of trans

## 2021-03-01 NOTE — H&P
[address] Patient Status:  Inpatient    1945 MRN [id number]   Location [facility] 3W/SW Attending Lina Jurado MD   Hosp Day # 0 PCP Alicja Collins MD     Date:  3/1/2021  Date type 2   • Diabetes Sister         type 2      reports that he quit smoking about 63 years ago. He has never used smokeless tobacco. He reports current alcohol use. He reports that he does not use drugs.     Allergies:  No Known Allergies    Home Medication °C)] 98 °F (36.7 °C)  Pulse:  [46-58] 58  Resp:  [11-20] 18  BP: (125-144)/(73-88) 144/88    General:  Alert and oriented. Diffuse skin problem:  None.   Eye:  Pupils are equal, round and reactive to light, extraocular movements are intact, Normal conjunct MD  3/1/2021  3:29 AM

## 2021-03-01 NOTE — PLAN OF CARE
Pt resting comfortably in bed. VSS. HR ranging high 40s to low 60s. Asymptomatic, denies pain. Echo and carotid Us today.    Problem: Patient Centered Care  Goal: Patient preferences are identified and integrated in the patient's plan of care  Description:

## 2021-03-01 NOTE — PROGRESS NOTES
Post midnight follow up note. Patient was seen and examined. No complaints at present. Feeling back to his normal self. States he does 2+ miles on the treadmill daily and doesn't always stay as hydrated as he should.    US carotids with no hemodynamically

## 2021-03-01 NOTE — PLAN OF CARE
Dot Kilgore is alert and oriented, ambulatory self care. He is primarily [address] speaking, but does understand some [place]. His son is at the bedside. Today he went for a carotid doppler and echocardiogram. Both of which were negative.  Plan is to discharge ho

## 2021-03-01 NOTE — ED INITIAL ASSESSMENT (HPI)
Patient presents to ER via EMS after syncopal episode while in the bathroom. Per family report, they heard patient fall. Denies use of blood thinners.

## 2021-03-04 NOTE — PAYOR COMM NOTE
--------------  DISCHARGE REVIEW    Payor: Candace Zaman  Subscriber #:  [id number]  Authorization Number: [id number]    Admit date: 3/1/21  Admit time:  [address]  Discharge Date: 3/1/2021  6:05 PM     Admitting Physician: Neymar Jade MD  Attending Physi dry  Psych: Normal affect  Ext: no c/c/e    History of Present Illness:  78year old male, with a past medical history significant for carcinoma prostate currently on androgen deprivation therapy presents after syncopal episode at home.   Claims he woke up information:  Inder Bueno MD In 1 week.     Specialty: Family Medicine  Contact information:  [address]  [phone]              Discharge

## 2021-03-04 NOTE — PAYOR COMM NOTE
--------------  ADMISSION REVIEW     Payor: Len Benites 673 #:  [id number]  Authorization Number: [id number]    Admit date: 3/1/21  Admit time: 8812     Admitting Physician: Edwin Davis MD  Attending Physician:  No att. providers found  [facility] • COLONOSCOPY N/A 5/1/2018    Performed by Soren Vega MD at [place] [address] N/A 11/12/2020    Performed by Princess Bette MD at [facility] OR   • TONSILLECTOMY     • VASECTOMY       Review of Systems   Con Comments: 5/5 strength in b/l UEs and LEs, normal sensation in all extremities, normal finger to nose b/l, normal heel to shin b/l, normal gait, no facial asymmetry, normal speech     Psychiatric:         Mood and Affect: Mood normal.     ED Course Neutrophil Absolute 3.12 1.50 - 7.70 x10(3) uL    Lymphocyte Absolute 2.51 1.00 - 4.00 x10(3) uL    Monocyte Absolute 0.54 0.10 - 1.00 x10(3) uL    Eosinophil Absolute 0.11 0.00 - 0.70 x10(3) uL    Basophil Absolute 0.03 0.00 - 0.20 x10(3) uL    Immature EMERGENCY DEPARTMENT MEDICAL DECISION MAKING:  After obtaining the patient's history, performing the physical exam and reviewing the diagnostics, multiple initial diagnoses were considered based on the presenting problem including syncope, PE, ACS, arrhyth He denies any preceding headache, chest pain palpitations focal numbness weakness or tingling. Allergies:  No Known Allergies    Review of Systems:  Constitutional:  Weakness, Fatigue. Eye:  Negative. Ear/Nose/Mouth/Throat:  Negative.   Respiratory:  N CO2 30.0 03/01/2021     03/01/2021    CA 8.6 03/01/2021    DDIMER 0.33 03/01/2021    TROP <0.045 03/01/2021     Imaging:  No results found.      Assessment and Plan:  Syncope  Appears vasovagal, will get 2D echo and carotid Dopplers to further evalu Patient was seen and examined. No complaints at present. Feeling back to his normal self. States he does 2+ miles on the treadmill daily and doesn't always stay as hydrated as he should.    US carotids with no hemodynamically significant area of stenosis in status:  Elective.  Procedure:  Transthoracic echocardiography. Scanning was performed from the parasternal, apical, and subcostal   acoustic windows.  Study completion:  The patient tolerated the   procedure well. There were no complications.     MEDICAT

## 2021-03-12 DIAGNOSIS — Z23 NEED FOR VACCINATION: ICD-10-CM

## 2021-04-30 ENCOUNTER — HOSPITAL ENCOUNTER (OUTPATIENT)
Age: 76
Discharge: HOME | End: 2021-04-30
Attending: PHYSICIAN ASSISTANT
Payer: MEDICARE

## 2021-04-30 VITALS
SYSTOLIC BLOOD PRESSURE: 130 MMHG | OXYGEN SATURATION: 100 % | TEMPERATURE: 97 F | DIASTOLIC BLOOD PRESSURE: 71 MMHG | HEART RATE: 67 BPM | RESPIRATION RATE: 20 BRPM

## 2021-04-30 DIAGNOSIS — Z20.822 ENCOUNTER FOR LABORATORY TESTING FOR COVID-19 VIRUS: Primary | ICD-10-CM

## 2021-04-30 DIAGNOSIS — Z20.822 EXPOSURE TO COVID-19 VIRUS: ICD-10-CM

## 2021-04-30 PROCEDURE — U0002 COVID-19 LAB TEST NON-CDC: HCPCS | Performed by: PHYSICIAN ASSISTANT

## 2021-04-30 PROCEDURE — 99202 OFFICE O/P NEW SF 15 MIN: CPT | Performed by: PHYSICIAN ASSISTANT

## 2021-04-30 NOTE — ED PROVIDER NOTES
Patient Seen in: [facility] [place]    History   Patient presents with:  Covid-19 Test: Entered by patient  Testing    Stated Complaint: Covid-19 Test    HPI    Carl Berg is a 78year old male who presents to immediate care requesting testing Alzheimer's   • Hypertension Brother    • Lipids Brother         hyperlipidemia   • Diabetes Brother         type 2   • Diabetes Sister         type 2       Social History    Tobacco Use      Smoking status: Former Smoker        Quit date: 11/11/1957 murmurs, gallops, rubs. Capillary refill is brisk. No extremity edema. Gastrointestinal: Abdomen soft, nontender, nondistended. There is no rebound tenderness or guarding. No organomegaly is noted. No peritoneal signs. Genitourinary: Not examined.   L

## 2021-08-06 ENCOUNTER — LAB ENCOUNTER (OUTPATIENT)
Dept: LAB | Age: 76
End: 2021-08-06
Attending: FAMILY MEDICINE
Payer: MEDICARE

## 2021-08-06 ENCOUNTER — OFFICE VISIT (OUTPATIENT)
Dept: FAMILY MEDICINE CLINIC | Facility: CLINIC | Age: 76
End: 2021-08-06
Payer: MEDICARE

## 2021-08-06 VITALS
BODY MASS INDEX: 25.9 KG/M2 | SYSTOLIC BLOOD PRESSURE: 128 MMHG | HEIGHT: 72 IN | HEART RATE: 58 BPM | DIASTOLIC BLOOD PRESSURE: 80 MMHG | WEIGHT: 191.19 LBS

## 2021-08-06 DIAGNOSIS — E55.9 HYPOVITAMINOSIS D: ICD-10-CM

## 2021-08-06 DIAGNOSIS — R73.03 PREDIABETES: ICD-10-CM

## 2021-08-06 DIAGNOSIS — H81.09 MENIERE'S DISEASE, UNSPECIFIED LATERALITY: ICD-10-CM

## 2021-08-06 DIAGNOSIS — Z00.00 MEDICARE ANNUAL WELLNESS VISIT, SUBSEQUENT: Primary | ICD-10-CM

## 2021-08-06 DIAGNOSIS — H90.6 MIXED CONDUCTIVE AND SENSORINEURAL HEARING LOSS OF BOTH EARS: ICD-10-CM

## 2021-08-06 DIAGNOSIS — K63.5 POLYP OF SIGMOID COLON, UNSPECIFIED TYPE: ICD-10-CM

## 2021-08-06 DIAGNOSIS — R79.89 ELEVATED LFTS: ICD-10-CM

## 2021-08-06 DIAGNOSIS — K57.30 DIVERTICULOSIS OF LARGE INTESTINE WITHOUT HEMORRHAGE: ICD-10-CM

## 2021-08-06 DIAGNOSIS — N18.31 STAGE 3A CHRONIC KIDNEY DISEASE (HCC): ICD-10-CM

## 2021-08-06 DIAGNOSIS — E78.00 PURE HYPERCHOLESTEROLEMIA: ICD-10-CM

## 2021-08-06 DIAGNOSIS — C61 PROSTATE CANCER (HCC): ICD-10-CM

## 2021-08-06 DIAGNOSIS — N52.9 ERECTILE DYSFUNCTION, UNSPECIFIED ERECTILE DYSFUNCTION TYPE: ICD-10-CM

## 2021-08-06 LAB
ALBUMIN SERPL-MCNC: 3.8 G/DL (ref 3.4–5)
ALBUMIN/GLOB SERPL: 1.4 {RATIO} (ref 1–2)
ALP LIVER SERPL-CCNC: 82 U/L
ALT SERPL-CCNC: 61 U/L
ANION GAP SERPL CALC-SCNC: 7 MMOL/L (ref 0–18)
AST SERPL-CCNC: 40 U/L (ref 15–37)
BASOPHILS # BLD AUTO: 0.03 X10(3) UL (ref 0–0.2)
BASOPHILS NFR BLD AUTO: 0.9 %
BILIRUB SERPL-MCNC: 0.8 MG/DL (ref 0.1–2)
BILIRUB UR QL: NEGATIVE
BUN BLD-MCNC: 14 MG/DL (ref 7–18)
BUN/CREAT SERPL: 15.6 (ref 10–20)
CALCIUM BLD-MCNC: 9.3 MG/DL (ref 8.5–10.1)
CHLORIDE SERPL-SCNC: 107 MMOL/L (ref 98–112)
CHOLEST SMN-MCNC: 173 MG/DL (ref ?–200)
CO2 SERPL-SCNC: 28 MMOL/L (ref 21–32)
COLOR UR: YELLOW
CREAT BLD-MCNC: 0.9 MG/DL
DEPRECATED RDW RBC AUTO: 45.2 FL (ref 35.1–46.3)
EOSINOPHIL # BLD AUTO: 0.1 X10(3) UL (ref 0–0.7)
EOSINOPHIL NFR BLD AUTO: 2.9 %
ERYTHROCYTE [DISTWIDTH] IN BLOOD BY AUTOMATED COUNT: 13.2 % (ref 11–15)
EST. AVERAGE GLUCOSE BLD GHB EST-MCNC: 120 MG/DL (ref 68–126)
GLOBULIN PLAS-MCNC: 2.8 G/DL (ref 2.8–4.4)
GLUCOSE BLD-MCNC: 82 MG/DL (ref 70–99)
GLUCOSE UR-MCNC: NEGATIVE MG/DL
HBA1C MFR BLD HPLC: 5.8 % (ref ?–5.7)
HCT VFR BLD AUTO: 37.1 %
HDLC SERPL-MCNC: 39 MG/DL (ref 40–59)
HGB BLD-MCNC: 12.7 G/DL
HGB UR QL STRIP.AUTO: NEGATIVE
IMM GRANULOCYTES # BLD AUTO: 0.02 X10(3) UL (ref 0–1)
IMM GRANULOCYTES NFR BLD: 0.6 %
KETONES UR-MCNC: NEGATIVE MG/DL
LDLC SERPL CALC-MCNC: 116 MG/DL (ref ?–100)
LEUKOCYTE ESTERASE UR QL STRIP.AUTO: NEGATIVE
LYMPHOCYTES # BLD AUTO: 0.39 X10(3) UL (ref 1–4)
LYMPHOCYTES NFR BLD AUTO: 11.4 %
M PROTEIN MFR SERPL ELPH: 6.6 G/DL (ref 6.4–8.2)
MCH RBC QN AUTO: 31.8 PG (ref 26–34)
MCHC RBC AUTO-ENTMCNC: 34.2 G/DL (ref 31–37)
MCV RBC AUTO: 93 FL
MONOCYTES # BLD AUTO: 0.45 X10(3) UL (ref 0.1–1)
MONOCYTES NFR BLD AUTO: 13.2 %
NEUTROPHILS # BLD AUTO: 2.43 X10 (3) UL (ref 1.5–7.7)
NEUTROPHILS # BLD AUTO: 2.43 X10(3) UL (ref 1.5–7.7)
NEUTROPHILS NFR BLD AUTO: 71 %
NITRITE UR QL STRIP.AUTO: NEGATIVE
NONHDLC SERPL-MCNC: 134 MG/DL (ref ?–130)
OSMOLALITY SERPL CALC.SUM OF ELEC: 294 MOSM/KG (ref 275–295)
PATIENT FASTING Y/N/NP: YES
PATIENT FASTING Y/N/NP: YES
PH UR: 7 [PH] (ref 5–8)
PLATELET # BLD AUTO: 160 10(3)UL (ref 150–450)
POTASSIUM SERPL-SCNC: 3.5 MMOL/L (ref 3.5–5.1)
PROT UR-MCNC: NEGATIVE MG/DL
PSA SERPL-MCNC: 0.01 NG/ML (ref ?–4)
RBC # BLD AUTO: 3.99 X10(6)UL
SODIUM SERPL-SCNC: 142 MMOL/L (ref 136–145)
SP GR UR STRIP: 1.01 (ref 1–1.03)
TRIGL SERPL-MCNC: 98 MG/DL (ref 30–149)
TSI SER-ACNC: 2.88 MIU/ML (ref 0.36–3.74)
UROBILINOGEN UR STRIP-ACNC: <2
VIT D+METAB SERPL-MCNC: 28.4 NG/ML (ref 30–100)
VLDLC SERPL CALC-MCNC: 17 MG/DL (ref 0–30)
WBC # BLD AUTO: 3.4 X10(3) UL (ref 4–11)

## 2021-08-06 PROCEDURE — 84153 ASSAY OF PSA TOTAL: CPT | Performed by: FAMILY MEDICINE

## 2021-08-06 PROCEDURE — 36415 COLL VENOUS BLD VENIPUNCTURE: CPT | Performed by: FAMILY MEDICINE

## 2021-08-06 PROCEDURE — 84443 ASSAY THYROID STIM HORMONE: CPT | Performed by: FAMILY MEDICINE

## 2021-08-06 PROCEDURE — 3008F BODY MASS INDEX DOCD: CPT | Performed by: FAMILY MEDICINE

## 2021-08-06 PROCEDURE — 83036 HEMOGLOBIN GLYCOSYLATED A1C: CPT | Performed by: FAMILY MEDICINE

## 2021-08-06 PROCEDURE — 99397 PER PM REEVAL EST PAT 65+ YR: CPT | Performed by: FAMILY MEDICINE

## 2021-08-06 PROCEDURE — 81001 URINALYSIS AUTO W/SCOPE: CPT | Performed by: FAMILY MEDICINE

## 2021-08-06 PROCEDURE — 80061 LIPID PANEL: CPT | Performed by: FAMILY MEDICINE

## 2021-08-06 PROCEDURE — 3074F SYST BP LT 130 MM HG: CPT | Performed by: FAMILY MEDICINE

## 2021-08-06 PROCEDURE — 82306 VITAMIN D 25 HYDROXY: CPT | Performed by: FAMILY MEDICINE

## 2021-08-06 PROCEDURE — 85025 COMPLETE CBC W/AUTO DIFF WBC: CPT | Performed by: FAMILY MEDICINE

## 2021-08-06 PROCEDURE — 3079F DIAST BP 80-89 MM HG: CPT | Performed by: FAMILY MEDICINE

## 2021-08-06 PROCEDURE — 80053 COMPREHEN METABOLIC PANEL: CPT | Performed by: FAMILY MEDICINE

## 2021-08-06 PROCEDURE — 96160 PT-FOCUSED HLTH RISK ASSMT: CPT | Performed by: FAMILY MEDICINE

## 2021-08-06 PROCEDURE — 81015 MICROSCOPIC EXAM OF URINE: CPT | Performed by: FAMILY MEDICINE

## 2021-08-06 PROCEDURE — G0439 PPPS, SUBSEQ VISIT: HCPCS | Performed by: FAMILY MEDICINE

## 2021-08-06 NOTE — PROGRESS NOTES
HPI:   Bina Jauregui is a 78year old male who presents for a Medicare Subsequent Annual Wellness visit (Pt already had Initial Annual Wellness).     Bina Jauregui is a 78year old male with hx prostate cancer, who is here for routine periodic health sc present), and forms available to patient in AVS       He smoked tobacco in the past but quit greater than 12 months ago.   Social History    Tobacco Use      Smoking status: Former Smoker        Quit date: 1957        Years since quittin.7      [facility]), Colon polyps (2010), Diverticulosis (20120), Hearing impairment, High cholesterol, Meniere disease (2013), and Onychomycosis (2010).     He  has a past surgical history that includes tonsillectomy; adenoidectomy; vasectomy; colonoscopy; colonoscopy ( when two or more people are talking at the same time: Yes   I have trouble understanding things on the TV: Sometimes I have to strain to understand conversations: Yes   I have to worry about missing the telephone ring or doorbell: Yes I have trouble hearin normal.      Breath sounds: Normal breath sounds. Abdominal:      General: Abdomen is flat. Bowel sounds are normal.      Palpations: Abdomen is soft. There is no hepatomegaly, splenomegaly or mass. Tenderness: There is no abdominal tenderness. Free T4      Urinalysis, Routine      Urine Microscopic w Reflex CULTURE      Vitamin D, 25-Hydroxy   Referrals: ENT - EXTERNAL  In-House; Urine dip. Test/Procedures done today include: EKG. IMMUNIZATIONS: none given today.     RECOMMENDATIONS given inc colonoscopy     9. Stage 3a chronic kidney disease ([facility] [address]  Lab : CMP    10. Meniere's disease, unspecified laterality  Referral :  ENT - EXTERNAL    11.  Mixed conductive and sensorineural hearing loss of both ears  As above  Referral   - ENT - EXTERNAL    1 (EKG)   Covered if needed at Welcome to Medicare, and non-screening if indicated for medical reasons 03/01/2021      Ultrasound Screening for Abdominal Aortic Aneurysm (AAA) Covered once in a lifetime for one of the following risk factors   • Men who are 6

## 2021-08-08 RX ORDER — ERGOCALCIFEROL 1.25 MG/1
50000 CAPSULE ORAL WEEKLY
Qty: 12 CAPSULE | Refills: 4 | Status: SHIPPED | OUTPATIENT
Start: 2021-08-08 | End: 2021-09-07

## 2021-11-11 ENCOUNTER — IMMUNIZATION (OUTPATIENT)
Dept: LAB | Facility: HOSPITAL | Age: 76
End: 2021-11-11
Attending: EMERGENCY MEDICINE
Payer: MEDICARE

## 2021-11-11 DIAGNOSIS — Z23 NEED FOR VACCINATION: Primary | ICD-10-CM

## 2021-11-11 PROCEDURE — 0003A SARSCOV2 VAC 30MCG/0.3ML IM: CPT

## 2021-11-21 ENCOUNTER — HOSPITAL ENCOUNTER (OUTPATIENT)
Age: 76
Discharge: HOME | End: 2021-11-21
Payer: MEDICARE

## 2021-11-21 VITALS
HEART RATE: 61 BPM | BODY MASS INDEX: 26.55 KG/M2 | SYSTOLIC BLOOD PRESSURE: 127 MMHG | OXYGEN SATURATION: 100 % | DIASTOLIC BLOOD PRESSURE: 81 MMHG | WEIGHT: 196 LBS | RESPIRATION RATE: 18 BRPM | HEIGHT: 72 IN | TEMPERATURE: 99 F

## 2021-11-21 DIAGNOSIS — U07.1 COVID-19: Primary | ICD-10-CM

## 2021-11-21 DIAGNOSIS — R09.81 NASAL CONGESTION: ICD-10-CM

## 2021-11-21 PROCEDURE — 99213 OFFICE O/P EST LOW 20 MIN: CPT | Performed by: PHYSICIAN ASSISTANT

## 2021-11-21 PROCEDURE — U0002 COVID-19 LAB TEST NON-CDC: HCPCS | Performed by: PHYSICIAN ASSISTANT

## 2021-11-21 NOTE — ED PROVIDER NOTES
Patient Seen in: [facility]      History   Patient presents with:  Covid-19 Test: Entered by patient    Stated Complaint: Covid-19 Test runny nose x 3 days    Subjective:   HPI    70-year-old male past medical history of prostate cancer here cm (6')   Wt 88.9 kg   SpO2 100%   BMI 26.58 kg/m²         Physical Exam  Vitals and nursing note reviewed. Constitutional:       General: He is not in acute distress. HENT:      Head: Normocephalic and atraumatic.       Right Ear: External ear normal. discuss antibody infusion          Medications Prescribed:  Discharge Medication List as of 11/21/2021 12:29 PM

## 2021-11-22 ENCOUNTER — TELEPHONE (OUTPATIENT)
Dept: CASE MANAGEMENT | Age: 76
End: 2021-11-22

## 2022-04-28 ENCOUNTER — ORDER TRANSCRIPTION (OUTPATIENT)
Dept: PHYSICAL THERAPY | Facility: HOSPITAL | Age: 77
End: 2022-04-28

## 2022-04-28 ENCOUNTER — TELEPHONE (OUTPATIENT)
Dept: PHYSICAL THERAPY | Facility: HOSPITAL | Age: 77
End: 2022-04-28

## 2022-10-20 ENCOUNTER — EKG ENCOUNTER (OUTPATIENT)
Dept: LAB | Age: 77
End: 2022-10-20
Attending: FAMILY MEDICINE
Payer: MEDICARE

## 2022-10-20 ENCOUNTER — OFFICE VISIT (OUTPATIENT)
Dept: FAMILY MEDICINE CLINIC | Facility: CLINIC | Age: 77
End: 2022-10-20
Payer: MEDICARE

## 2022-10-20 VITALS
HEART RATE: 66 BPM | WEIGHT: 197 LBS | BODY MASS INDEX: 26.68 KG/M2 | HEIGHT: 72 IN | DIASTOLIC BLOOD PRESSURE: 74 MMHG | SYSTOLIC BLOOD PRESSURE: 134 MMHG

## 2022-10-20 DIAGNOSIS — E55.9 HYPOVITAMINOSIS D: ICD-10-CM

## 2022-10-20 DIAGNOSIS — M15.9 PRIMARY OSTEOARTHRITIS INVOLVING MULTIPLE JOINTS: ICD-10-CM

## 2022-10-20 DIAGNOSIS — Z00.00 MEDICARE ANNUAL WELLNESS VISIT, SUBSEQUENT: Primary | ICD-10-CM

## 2022-10-20 DIAGNOSIS — K57.30 DIVERTICULOSIS OF LARGE INTESTINE WITHOUT HEMORRHAGE: ICD-10-CM

## 2022-10-20 DIAGNOSIS — K63.5 POLYP OF SIGMOID COLON, UNSPECIFIED TYPE: ICD-10-CM

## 2022-10-20 DIAGNOSIS — C61 PROSTATE CANCER (HCC): ICD-10-CM

## 2022-10-20 DIAGNOSIS — E78.00 PURE HYPERCHOLESTEROLEMIA: ICD-10-CM

## 2022-10-20 DIAGNOSIS — H90.6 MIXED CONDUCTIVE AND SENSORINEURAL HEARING LOSS OF BOTH EARS: ICD-10-CM

## 2022-10-20 DIAGNOSIS — N52.9 ERECTILE DYSFUNCTION, UNSPECIFIED ERECTILE DYSFUNCTION TYPE: ICD-10-CM

## 2022-10-20 DIAGNOSIS — R79.89 ELEVATED LFTS: ICD-10-CM

## 2022-10-20 DIAGNOSIS — H81.09 MENIERE'S DISEASE, UNSPECIFIED LATERALITY: ICD-10-CM

## 2022-10-20 DIAGNOSIS — Z91.81 RISK FOR FALLS: ICD-10-CM

## 2022-10-20 DIAGNOSIS — Z12.11 COLON CANCER SCREENING: ICD-10-CM

## 2022-10-20 DIAGNOSIS — Z00.00 MEDICARE ANNUAL WELLNESS VISIT, SUBSEQUENT: ICD-10-CM

## 2022-10-20 DIAGNOSIS — R73.03 PREDIABETES: ICD-10-CM

## 2022-10-20 PROCEDURE — 93005 ELECTROCARDIOGRAM TRACING: CPT

## 2022-10-20 PROCEDURE — 93010 ELECTROCARDIOGRAM REPORT: CPT | Performed by: FAMILY MEDICINE

## 2022-10-20 RX ORDER — TRIAMTERENE AND HYDROCHLOROTHIAZIDE 37.5; 25 MG/1; MG/1
1 CAPSULE ORAL EVERY MORNING
Qty: 90 CAPSULE | Refills: 1 | Status: SHIPPED | OUTPATIENT
Start: 2022-10-20

## 2022-10-20 RX ORDER — MECLIZINE HCL 12.5 MG/1
12.5 TABLET ORAL 3 TIMES DAILY PRN
Qty: 60 TABLET | Refills: 2 | Status: SHIPPED | OUTPATIENT
Start: 2022-10-20

## 2022-10-24 ENCOUNTER — TELEPHONE (OUTPATIENT)
Dept: FAMILY MEDICINE CLINIC | Facility: CLINIC | Age: 77
End: 2022-10-24

## 2022-10-24 NOTE — TELEPHONE ENCOUNTER
Initiated PA for Meclizine through [employer]. Await outcome which may take 1-5 business days.

## 2023-03-07 ENCOUNTER — TELEPHONE (OUTPATIENT)
Facility: CLINIC | Age: 78
End: 2023-03-07

## 2023-03-07 NOTE — TELEPHONE ENCOUNTER
----- Message from Justina Aldridge RN sent at 5/3/2018 11:13 AM CDT -----  Regardin yr CLN recall  Entered into EPIC:Recall colon in 5 years per Dr. Lana Velazquez. Last Colon done 18, next due 23. Snapshot updated.

## 2023-04-24 ENCOUNTER — TELEPHONE (OUTPATIENT)
Dept: FAMILY MEDICINE CLINIC | Facility: CLINIC | Age: 78
End: 2023-04-24

## 2023-04-24 DIAGNOSIS — Z12.11 COLON CANCER SCREENING: Primary | ICD-10-CM

## 2023-04-24 NOTE — TELEPHONE ENCOUNTER
Patient is requesting referral.   Per the patient he received a letter stating that he is due for a colonoscopy. Name of specialist and specialty department : [facility]  Reason for visit with the specialist: Colonoscopy  Address of the specialist office:[place] [address] [address] [place], [place]   Appointment date: no appointment  scheduled yet. CSS informed patient the turnaround time for referral is 5-7 business days. Patient was informed to check their [employer] account for referral status.

## 2023-04-25 NOTE — TELEPHONE ENCOUNTER
Good Afternoon Dr Baljit Patton and staf    Please review pended referral for GI, Dr Danitza Mandujano and add DX codes.     Thank you    [facility]

## 2023-05-18 ENCOUNTER — TELEPHONE (OUTPATIENT)
Facility: CLINIC | Age: 78
End: 2023-05-18

## 2023-05-18 DIAGNOSIS — Z12.11 SCREENING FOR COLON CANCER: Primary | ICD-10-CM

## 2023-05-19 NOTE — TELEPHONE ENCOUNTER
Last Procedure, Date, MD: Colonoscopy, 5/1/18, SAMMIE   Last Diagnosis: normal cln    Recalled for (mth/yrs): 5 years  Sedation used previously: IV    Last Prep Used (if known):    Quality of prep (if known): good  Anticoagulants: n/a  Diabetic Meds: n/a  Weight loss meds (Phentermine/Vyvanse): n/a  Iron supplement (RX/OTC): n/a  Marijuana/Vaping/CBD: n/a  Height & Weight + BMI: 6'/195lbs/26.4  Hx of Cardiac/CVA issues/(MI/Stroke): n/a  Devices Pacemaker/Defibrillator/Stents: n/a  Resp. Issues/Oxygen Use/HANSEL/COPD: n/a  Issues w/Anesthesia: n/a    Symptoms (Y/N): N  Symptoms Details: n/a    Special Comments/Notes: verified allergies, medications, pharmacy    Please advise on orders and prep. Thank you.

## 2023-05-24 RX ORDER — POLYETHYLENE GLYCOL 3350, SODIUM CHLORIDE, SODIUM BICARBONATE, POTASSIUM CHLORIDE 420; 11.2; 5.72; 1.48 G/4L; G/4L; G/4L; G/4L
POWDER, FOR SOLUTION ORAL
Qty: 4000 ML | Refills: 0 | Status: SHIPPED | OUTPATIENT
Start: 2023-05-24

## 2023-05-24 NOTE — TELEPHONE ENCOUNTER
Scheduling:  cln w/ mello w/ Dr. Brandy Lawrence  Dx: crc screening  trilyte split dose sent e-scribe

## 2023-05-26 NOTE — TELEPHONE ENCOUNTER
Scheduled for:  Colonoscopy 02917  Provider Name:  Dr Nora Rodriguez  Date:  09/26/23  Location:  [facility]  Sedation:  MAC  Time:  1230PM (pt is aware to arrive at 1130AM)  Prep: Trilyte  Meds/Allergies Reconciled?:  Physician reviewed  Diagnosis with codes:  CCS Z12.11  Was patient informed to call insurance with codes (Y/N):  Y     Referral sent?:  Referral was sent at the time of electronic surgical scheduling. [address] [facility] or [address]  notified?:  I sent an electronic request to Endo Scheduling and received a confirmation today. Medication Orders:  Pt is aware to NOT take iron pills, herbal meds and diet supplements for 7 days before exam. Also to NOT take any form of alcohol, recreational drugs and any forms of ED meds 24 hours before exam.     Misc Orders:       Further instructions given by staff:  I discussed the prep instructions with the patient which he verbally understood and is aware that I will send the instructions today via [employer].

## 2023-05-26 NOTE — TELEPHONE ENCOUNTER
Called patient to schedule MAB infusion  Patient states he is thinking about if he wants to have the infusion and he states he also wants to discuss this with his specialist at [facility].    He states he will call us back if he decides to have infusion
You can access the FollowMyHealth Patient Portal offered by [facility] by registering at the following website: [email or web address]. By joining [employer]’s FollowMyHealth portal, you will also be able to view your health information using other applications (apps) compatible with our system.

## 2023-09-15 ENCOUNTER — APPOINTMENT (OUTPATIENT)
Dept: GENERAL RADIOLOGY | Age: 78
End: 2023-09-15
Attending: PHYSICIAN ASSISTANT
Payer: MEDICARE

## 2023-09-15 ENCOUNTER — HOSPITAL ENCOUNTER (OUTPATIENT)
Age: 78
Discharge: HOME | End: 2023-09-15
Payer: MEDICARE

## 2023-09-15 VITALS
SYSTOLIC BLOOD PRESSURE: 126 MMHG | OXYGEN SATURATION: 97 % | DIASTOLIC BLOOD PRESSURE: 77 MMHG | TEMPERATURE: 98 F | RESPIRATION RATE: 16 BRPM | HEART RATE: 52 BPM

## 2023-09-15 DIAGNOSIS — M79.641 HAND PAIN, RIGHT: Primary | ICD-10-CM

## 2023-09-15 PROCEDURE — A4565 SLINGS: HCPCS | Performed by: PHYSICIAN ASSISTANT

## 2023-09-15 PROCEDURE — 99213 OFFICE O/P EST LOW 20 MIN: CPT | Performed by: PHYSICIAN ASSISTANT

## 2023-09-15 PROCEDURE — 73130 X-RAY EXAM OF HAND: CPT | Performed by: PHYSICIAN ASSISTANT

## 2023-09-15 RX ORDER — PREDNISONE 20 MG/1
40 TABLET ORAL DAILY
Qty: 10 TABLET | Refills: 0 | Status: SHIPPED | OUTPATIENT
Start: 2023-09-15 | End: 2023-09-18

## 2023-09-15 NOTE — ED INITIAL ASSESSMENT (HPI)
Pt with R hand pain and swelling x10 days. Pt rated pain 9/10. Pt stated that he spoke to PCPs office who sent him here for Xrays. Pt denied trauma, numbness, tingling, fevers.

## 2023-09-18 ENCOUNTER — OFFICE VISIT (OUTPATIENT)
Dept: FAMILY MEDICINE CLINIC | Facility: CLINIC | Age: 78
End: 2023-09-18

## 2023-09-18 ENCOUNTER — LAB ENCOUNTER (OUTPATIENT)
Dept: LAB | Age: 78
End: 2023-09-18
Attending: FAMILY MEDICINE
Payer: MEDICARE

## 2023-09-18 VITALS
HEART RATE: 45 BPM | SYSTOLIC BLOOD PRESSURE: 143 MMHG | WEIGHT: 198.19 LBS | DIASTOLIC BLOOD PRESSURE: 70 MMHG | BODY MASS INDEX: 27 KG/M2

## 2023-09-18 DIAGNOSIS — Z12.11 COLON CANCER SCREENING: Primary | ICD-10-CM

## 2023-09-18 DIAGNOSIS — Z23 NEED FOR INFLUENZA VACCINATION: ICD-10-CM

## 2023-09-18 DIAGNOSIS — M19.90 ARTHRITIS: ICD-10-CM

## 2023-09-18 LAB — URATE SERPL-MCNC: 5.1 MG/DL

## 2023-09-18 PROCEDURE — 36415 COLL VENOUS BLD VENIPUNCTURE: CPT | Performed by: FAMILY MEDICINE

## 2023-09-18 PROCEDURE — 84550 ASSAY OF BLOOD/URIC ACID: CPT | Performed by: FAMILY MEDICINE

## 2023-09-22 ENCOUNTER — TELEPHONE (OUTPATIENT)
Facility: CLINIC | Age: 78
End: 2023-09-22

## 2023-09-22 NOTE — TELEPHONE ENCOUNTER
Called patient and over Colonoscopy prep instructions in Portuguese. Patient verbalized understanding and reports no more questions. Called pharmacy to confirm bowel prep was ready for patient.

## 2023-09-25 ENCOUNTER — TELEPHONE (OUTPATIENT)
Facility: CLINIC | Age: 78
End: 2023-09-25

## 2023-09-25 DIAGNOSIS — Z12.11 SCREEN FOR COLON CANCER: Primary | ICD-10-CM

## 2023-09-25 NOTE — TELEPHONE ENCOUNTER
CANCELLED  for:  Colonoscopy 44188  Provider Name:  Dr Tory Domínguez  Date:  09/26/23  Location:  [facility]  Sedation:  MAC  Time:  1230PM (pt is aware to arrive at 1130AM)  Prep: Trilyte  Meds/Allergies Reconciled?:  Physician reviewed  Diagnosis with codes:  CCS Z12.11    Cancelled in epic and endo

## 2023-09-28 RX ORDER — CELECOXIB 200 MG/1
200 CAPSULE ORAL 2 TIMES DAILY
Qty: 60 CAPSULE | Refills: 3 | Status: SHIPPED | OUTPATIENT
Start: 2023-09-28

## 2023-12-14 RX ORDER — METHYLPREDNISOLONE 4 MG/1
TABLET ORAL
Qty: 21 EACH | Refills: 0 | Status: SHIPPED | OUTPATIENT
Start: 2023-12-14

## 2024-01-19 ENCOUNTER — APPOINTMENT (OUTPATIENT)
Dept: GENERAL RADIOLOGY | Age: 79
End: 2024-01-19
Attending: Physician Assistant
Payer: MEDICARE

## 2024-01-19 ENCOUNTER — HOSPITAL ENCOUNTER (OUTPATIENT)
Age: 79
Discharge: HOME | End: 2024-01-19
Payer: MEDICARE

## 2024-01-19 VITALS
RESPIRATION RATE: 16 BRPM | SYSTOLIC BLOOD PRESSURE: 146 MMHG | OXYGEN SATURATION: 97 % | HEART RATE: 68 BPM | TEMPERATURE: 97 F | DIASTOLIC BLOOD PRESSURE: 86 MMHG

## 2024-01-19 DIAGNOSIS — J06.9 VIRAL UPPER RESPIRATORY ILLNESS: Primary | ICD-10-CM

## 2024-01-19 PROCEDURE — 71046 X-RAY EXAM CHEST 2 VIEWS: CPT | Performed by: PHYSICIAN ASSISTANT

## 2024-01-19 PROCEDURE — 99213 OFFICE O/P EST LOW 20 MIN: CPT | Performed by: PHYSICIAN ASSISTANT

## 2024-01-19 RX ORDER — BENZONATATE 200 MG/1
200 CAPSULE ORAL 3 TIMES DAILY PRN
Qty: 20 CAPSULE | Refills: 0 | Status: SHIPPED | OUTPATIENT
Start: 2024-01-19

## 2024-01-19 NOTE — ED PROVIDER NOTES
Chief Complaint   Patient presents with    Cough       History obtained from: patient    services declined    HPI:     Oscar Dumont is a 79 year old male who presents with cough x 2 weeks. Patient endorses associated nasal congestion, sore throat, and bilateral ear fullness.  No medications taken or treatments tried.  Patient continues to eat and drink normally.  Denies fever, chills, chest pain, shortness of breath, wheezing, abdominal pain, nausea, vomiting, diarrhea, rash.    Patient also complains of chronic pain and swelling to right hand.  Patient has been evaluated for this in the past and x-rays have shown signs of osteoarthritis and pseudogout in the first, second, and third digits.  Patient denies new or worsening pain or swelling and states second and third digits are always swollen.  Patient is not currently taking any medications for this.  Denies injury/trauma, falls, paresthesias, weakness, wound, change in skin color/temperature.    PMH  Past Medical History:   Diagnosis Date    Anxiety     Cancer (HCC)     Colon polyps 2010    no polyps on CLN in 2018, repeat CLN in 4/2023    Diverticulosis 20120    Hearing impairment     right ear, ringing     High cholesterol     Meniere disease 2013    Onychomycosis 2010    toes       PFSH    PFSH asessment screens reviewed and agree.  Nurses notes reviewed I agree with documentation.    Family History   Problem Relation Age of Onset    Dementia Mother         Alzheimer's    Hypertension Brother     Lipids Brother         hyperlipidemia    Diabetes Brother         type 2    Diabetes Sister         type 2     Family history reviewed with patient/caregiver and is not pertinent to presenting problem.  Social History     Socioeconomic History    Marital status:      Spouse name: Not on file    Number of children: Not on file    Years of education: Not on file    Highest education level: Not on file   Occupational History    Not on file   Tobacco  Use    Smoking status: Former     Types: Cigarettes     Quit date: 1957     Years since quittin.2    Smokeless tobacco: Never   Vaping Use    Vaping Use: Never used   Substance and Sexual Activity    Alcohol use: Not Currently     Alcohol/week: 0.0 standard drinks of alcohol     Comment: drinks alcohol on a social basis only.    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    Hobby Hazards Not Asked    Sleep Concern Not Asked    Stress Concern Not Asked    Weight Concern Not Asked    Special Diet Not Asked    Back Care Not Asked    Exercise Not Asked    Bike Helmet Not Asked    Seat Belt Not Asked    Self-Exams Not Asked   Social History Narrative    Not on file     Social Determinants of Health     Financial Resource Strain: Not on file   Food Insecurity: Not on file   Transportation Needs: Not on file   Physical Activity: Not on file   Stress: Not on file   Social Connections: Not on file   Housing Stability: Not on file         ROS:   Positive for stated complaint: cough, congestion, sore throat, bilateral ear fullness, right hand pain   All other systems reviewed and negative except as noted above.  Constitutional and Vital Signs Reviewed.    Physical Exam:     Findings:    /86   Pulse 68   Temp 97 °F (36.1 °C) (Temporal)   Resp 16   SpO2 97%   GENERAL: well developed, no acute distress, non-toxic appearing   SKIN: good skin turgor, no obvious rashes  HEAD: normocephalic, atraumatic  EYES: sclera non-icteric bilaterally, conjunctiva clear  EARS: TMs clear bilaterally, canals clear  NOSE: nasal congestion, no sinus tenderness bilaterally   OROPHARYNX: MMM, pharynx clear, without exudates or swelling, uvula midline, airway patent  NECK: supple, no adenopathy, no nuchal rigidity, no trismus, no edema, phonation normal    CARDIO: RRR,  normal heart sounds   LUNGS: clear to auscultation bilaterally, no increased WOB, no rales, rhonchi, or wheezes, occasional coughing   EXTREMITIES: HENDRICKS without difficulty, swelling to 2nd and 3rd digits of right hand, no obvious deformity, no tenderness, no pain with passive ROM, compartments soft, CMS intact, skin intact without overlying changes   GI: abdomen soft and non-tender   NEURO: no focal deficits  PSYCH: alert and oriented x3.  Answering questions appropriately.  Mood appropriate.    MDM/Assessment/Plan:   Orders for this encounter:    Orders Placed This Encounter    XR CHEST PA + LAT CHEST (CPT=71046)     cough 2w       Order Specific Question:   What is the Relevant Clinical Indication / Reason for Exam?     Answer:   cough 2w     Order Specific Question:   Release to patient     Answer:   Immediate    benzonatate 200 MG Oral Cap     Sig: Take 1 capsule (200 mg total) by mouth 3 (three) times daily as needed for cough.     Dispense:  20 capsule     Refill:  0       Labs performed this visit:  No results found for this or any previous visit (from the past 10 hour(s)).    Imaging performed this visit:  XR CHEST PA + LAT CHEST (CPT=71046)   Final Result   PROCEDURE: XR CHEST PA + LAT CHEST (CPT=71046)       COMPARISON: [facility], CT ABDOMEN + PELVIS (ALL W+WO)    (CPT=74178), 11/25/2020, 9:03 AM.       INDICATIONS: Cough for 2 weeks.       TECHNIQUE:   Two views.         FINDINGS:    CARDIAC/VASC: The cardiac silhouette is not enlarged.  The thoracic aorta    is slightly tortuous with atherosclerotic calcification involving the    aortic knob.  Unremarkable pulmonary vasculature.     MEDIAST/PAUL: No visible mass or adenopathy.    LUNGS/PLEURA: There is stable mild scarring in the right middle lobe and    lingula.  Otherwise no focal consolidation, pleural effusion or    pneumothorax.   BONES: There are mild spondylotic changes throughout the thoracic spine.   OTHER: Negative.                      =====   CONCLUSION:    1. No acute cardiopulmonary process.   2. Stable mild scarring at both lung bases.               Dictated by (CST): Bautista Peck MD on 1/19/2024 at 4:43 PM        Finalized by (CST): Bautista Peck MD on 1/19/2024 at 4:44 PM                   MDM:  DDx includes viral URI versus bronchitis versus pneumonia versus other.  Patient is overall very well-appearing with stable vitals and tolerating oral intake.  No hypoxia or signs of respiratory distress.  No chest pain or shortness of breath.  Chest x-ray reviewed, no evidence of acute cardiopulmonary process.  Discussed supportive care for suspected viral URI including rest, increase fluid intake, and OTC Tylenol as needed for pain or fevers.  Rx Tessalon for cough as needed.    Patient complaining of ongoing pain to right hand for several months. These symptoms have been evaluated and patient has had imaging in the past with findings suggestive of osteoarthritis and pseudogout.  There are no new or worsening symptoms today.  There are no signs of infection.  There are no signs of neurovascular compromise or compartment syndrome.  Discussed supportive care including rest, ice, elevation, and OTC Tylenol/Advil as needed for pain.      Instructed patient to go directly to nearest ER with any worsening or concerning symptoms.  Follow-up with PCP and hand specialist.  Patient verbalizes excellent understanding of these instructions and feels comfortable with this plan.    Diagnosis:    ICD-10-CM    1. Viral upper respiratory illness  J06.9           All results reviewed and discussed with patient/patient's family. All of patient's/patient's family's questions were addressed.   See AVS for detailed discharge instructions for your condition today.    Follow Up with:  Jay Jay Bundy MD  [address] [place] [address]  [phone]    Schedule an appointment as soon as possible for a visit       Arturo Carl  MD  [address] [facility] [address]  [phone]    Schedule an appointment as soon as possible for a visit   Hand specialist         Bisi Santos PA-C

## 2024-01-19 NOTE — DISCHARGE INSTRUCTIONS
Benzonatate for cough as needed up to 3 times daily     Alternate Tylenol and Motrin every 3 hours for pain or fever > 100.4 degrees  Drink plenty of fluids   Get plenty of rest     You may benefit from taking a decongestant (e.g. Sudafed)  You may benefit from taking a daily allergy medication (e.g. Zyrtec)  You may benefit from using a humidifier    Sleep with head elevated and avoid laying flat  Avoid having air blow on your face    Wash hands often  Disinfect your environment  Do not share utensils or drinks    Symptoms may take a few weeks to resolve  Follow up with your primary care provider

## 2024-04-08 NOTE — PROGRESS NOTES
6/14/2019  8:38 AM    Bettie Meek is a 69year old male. Chief complaint(s): Patient presents with:   Follow - Up: Patient here for f/u has not taken medication as prescribed for fungus  Fungus Nails    HPI:     Bettie Meek primary complaint is r History  Administered            Date(s) Administered    FLU VACC High Dose 65 YRS & Older PRSV Free (58954)                          09/30/2015      Fluvirin, 3 Years & >, Im                          10/22/2008      Fluzone Vaccine Medicare () RESULTS:     EKG / Spirometry : -     Radiology: No results found.      ASSESSMENT/PLAN:   Assessment   Benign prostatic hyperplasia with nocturia  (primary encounter diagnosis)    MEDICATIONS:  Tamsulosin 0.4 mg daily   Requested Prescriptions     Signed P [No Acute Distress] : no acute distress [Well Nourished] : well nourished [Well Developed] : well developed [Well-Appearing] : well-appearing [Normal Sclera/Conjunctiva] : normal sclera/conjunctiva [PERRL] : pupils equal round and reactive to light [EOMI] : extraocular movements intact [Normal Outer Ear/Nose] : the outer ears and nose were normal in appearance [Normal Oropharynx] : the oropharynx was normal [Normal TMs] : both tympanic membranes were normal [Normal Nasal Mucosa] : the nasal mucosa was normal [No Lymphadenopathy] : no lymphadenopathy [Supple] : supple [No Respiratory Distress] : no respiratory distress  [No Accessory Muscle Use] : no accessory muscle use [Clear to Auscultation] : lungs were clear to auscultation bilaterally [Normal Rate] : normal rate  [Regular Rhythm] : with a regular rhythm [Normal S1, S2] : normal S1 and S2 [No Edema] : there was no peripheral edema [Soft] : abdomen soft [Non Tender] : non-tender [Non-distended] : non-distended [Normal Bowel Sounds] : normal bowel sounds [Normal Posterior Cervical Nodes] : no posterior cervical lymphadenopathy [Normal Anterior Cervical Nodes] : no anterior cervical lymphadenopathy [No CVA Tenderness] : no CVA  tenderness [No Spinal Tenderness] : no spinal tenderness [No Joint Swelling] : no joint swelling [Grossly Normal Strength/Tone] : grossly normal strength/tone [No Rash] : no rash [Coordination Grossly Intact] : coordination grossly intact [Normal Gait] : normal gait [Normal Affect] : the affect was normal [Normal Insight/Judgement] : insight and judgment were intact

## 2024-05-08 ENCOUNTER — TELEPHONE (OUTPATIENT)
Dept: FAMILY MEDICINE CLINIC | Facility: CLINIC | Age: 79
End: 2024-05-08

## 2024-05-08 NOTE — TELEPHONE ENCOUNTER
Patient wants to change his appointment on 5/15/24 to an annual exam, but the next available appointment that comes up is not until 5/20/24.  Please call patient to let him know if his appointment can be changed.

## 2024-05-10 ENCOUNTER — TELEPHONE (OUTPATIENT)
Dept: FAMILY MEDICINE CLINIC | Facility: CLINIC | Age: 79
End: 2024-05-10

## 2024-05-10 NOTE — TELEPHONE ENCOUNTER
List given to Dr Julien to further assist.    Post-Care Instructions: I reviewed with the patient in detail post-care instructions. Patient is to avoid sunlight for the next 2 days, and wear sun protection. Patients may expect sunburn like redness, discomfort and scabbing.

## 2024-05-15 ENCOUNTER — LAB ENCOUNTER (OUTPATIENT)
Dept: LAB | Age: 79
End: 2024-05-15
Attending: FAMILY MEDICINE

## 2024-05-15 ENCOUNTER — OFFICE VISIT (OUTPATIENT)
Dept: FAMILY MEDICINE CLINIC | Facility: CLINIC | Age: 79
End: 2024-05-15

## 2024-05-15 ENCOUNTER — EKG ENCOUNTER (OUTPATIENT)
Dept: LAB | Age: 79
End: 2024-05-15
Attending: FAMILY MEDICINE

## 2024-05-15 VITALS
HEART RATE: 52 BPM | SYSTOLIC BLOOD PRESSURE: 135 MMHG | DIASTOLIC BLOOD PRESSURE: 70 MMHG | WEIGHT: 199.38 LBS | BODY MASS INDEX: 27 KG/M2

## 2024-05-15 DIAGNOSIS — E55.9 HYPOVITAMINOSIS D: ICD-10-CM

## 2024-05-15 DIAGNOSIS — Z00.00 MEDICARE ANNUAL WELLNESS VISIT, SUBSEQUENT: Primary | ICD-10-CM

## 2024-05-15 DIAGNOSIS — H90.6 MIXED CONDUCTIVE AND SENSORINEURAL HEARING LOSS OF BOTH EARS: ICD-10-CM

## 2024-05-15 DIAGNOSIS — C61 PROSTATE CANCER (HCC): ICD-10-CM

## 2024-05-15 DIAGNOSIS — M15.9 PRIMARY OSTEOARTHRITIS INVOLVING MULTIPLE JOINTS: ICD-10-CM

## 2024-05-15 DIAGNOSIS — Z00.00 MEDICARE ANNUAL WELLNESS VISIT, SUBSEQUENT: ICD-10-CM

## 2024-05-15 DIAGNOSIS — N52.9 ERECTILE DYSFUNCTION, UNSPECIFIED ERECTILE DYSFUNCTION TYPE: ICD-10-CM

## 2024-05-15 DIAGNOSIS — H81.09 MENIERE'S DISEASE, UNSPECIFIED LATERALITY: ICD-10-CM

## 2024-05-15 DIAGNOSIS — G62.89 OTHER POLYNEUROPATHY: ICD-10-CM

## 2024-05-15 DIAGNOSIS — R73.03 PREDIABETES: ICD-10-CM

## 2024-05-15 DIAGNOSIS — K63.5 POLYP OF SIGMOID COLON, UNSPECIFIED TYPE: ICD-10-CM

## 2024-05-15 DIAGNOSIS — J30.1 SEASONAL ALLERGIC RHINITIS DUE TO POLLEN: ICD-10-CM

## 2024-05-15 DIAGNOSIS — Z91.81 RISK FOR FALLS: ICD-10-CM

## 2024-05-15 DIAGNOSIS — K57.30 DIVERTICULOSIS OF LARGE INTESTINE WITHOUT HEMORRHAGE: ICD-10-CM

## 2024-05-15 DIAGNOSIS — R79.89 ELEVATED LFTS: ICD-10-CM

## 2024-05-15 DIAGNOSIS — E78.00 PURE HYPERCHOLESTEROLEMIA: ICD-10-CM

## 2024-05-15 DIAGNOSIS — Z12.11 COLON CANCER SCREENING: ICD-10-CM

## 2024-05-15 LAB
ALBUMIN SERPL-MCNC: 4.2 G/DL (ref 3.2–4.8)
ALBUMIN/GLOB SERPL: 1.4 {RATIO} (ref 1–2)
ALP LIVER SERPL-CCNC: 92 U/L
ALT SERPL-CCNC: 11 U/L
ANION GAP SERPL CALC-SCNC: 6 MMOL/L (ref 0–18)
AST SERPL-CCNC: 23 U/L (ref ?–34)
ATRIAL RATE: 48 BPM
BASOPHILS # BLD AUTO: 0.03 X10(3) UL (ref 0–0.2)
BASOPHILS NFR BLD AUTO: 0.6 %
BILIRUB SERPL-MCNC: 0.4 MG/DL (ref 0.2–1.1)
BILIRUB UR QL: NEGATIVE
BUN BLD-MCNC: 19 MG/DL (ref 9–23)
BUN/CREAT SERPL: 16.2 (ref 10–20)
CALCIUM BLD-MCNC: 9.6 MG/DL (ref 8.7–10.4)
CHLORIDE SERPL-SCNC: 104 MMOL/L (ref 98–112)
CHOLEST SERPL-MCNC: 208 MG/DL (ref ?–200)
CLARITY UR: CLEAR
CO2 SERPL-SCNC: 28 MMOL/L (ref 21–32)
COLOR UR: YELLOW
CREAT BLD-MCNC: 1.17 MG/DL
DEPRECATED RDW RBC AUTO: 42 FL (ref 35.1–46.3)
EGFRCR SERPLBLD CKD-EPI 2021: 64 ML/MIN/1.73M2 (ref 60–?)
EOSINOPHIL # BLD AUTO: 0.07 X10(3) UL (ref 0–0.7)
EOSINOPHIL NFR BLD AUTO: 1.5 %
ERYTHROCYTE [DISTWIDTH] IN BLOOD BY AUTOMATED COUNT: 13.4 % (ref 11–15)
EST. AVERAGE GLUCOSE BLD GHB EST-MCNC: 128 MG/DL (ref 68–126)
FASTING PATIENT LIPID ANSWER: YES
FASTING STATUS PATIENT QL REPORTED: YES
GLOBULIN PLAS-MCNC: 3 G/DL (ref 2–3.5)
GLUCOSE BLD-MCNC: 96 MG/DL (ref 70–99)
GLUCOSE UR-MCNC: NORMAL MG/DL
HBA1C MFR BLD: 6.1 % (ref ?–5.7)
HCT VFR BLD AUTO: 38.8 %
HDLC SERPL-MCNC: 39 MG/DL (ref 40–59)
HGB BLD-MCNC: 13.2 G/DL
HGB UR QL STRIP.AUTO: NEGATIVE
IMM GRANULOCYTES # BLD AUTO: 0.01 X10(3) UL (ref 0–1)
IMM GRANULOCYTES NFR BLD: 0.2 %
KETONES UR-MCNC: NEGATIVE MG/DL
LDLC SERPL CALC-MCNC: 151 MG/DL (ref ?–100)
LEUKOCYTE ESTERASE UR QL STRIP.AUTO: NEGATIVE
LYMPHOCYTES # BLD AUTO: 1.34 X10(3) UL (ref 1–4)
LYMPHOCYTES NFR BLD AUTO: 27.9 %
MCH RBC QN AUTO: 29.4 PG (ref 26–34)
MCHC RBC AUTO-ENTMCNC: 34 G/DL (ref 31–37)
MCV RBC AUTO: 86.4 FL
MONOCYTES # BLD AUTO: 0.39 X10(3) UL (ref 0.1–1)
MONOCYTES NFR BLD AUTO: 8.1 %
NEUTROPHILS # BLD AUTO: 2.96 X10 (3) UL (ref 1.5–7.7)
NEUTROPHILS # BLD AUTO: 2.96 X10(3) UL (ref 1.5–7.7)
NEUTROPHILS NFR BLD AUTO: 61.7 %
NITRITE UR QL STRIP.AUTO: NEGATIVE
NONHDLC SERPL-MCNC: 169 MG/DL (ref ?–130)
OSMOLALITY SERPL CALC.SUM OF ELEC: 288 MOSM/KG (ref 275–295)
P AXIS: 40 DEGREES
P-R INTERVAL: 164 MS
PH UR: 5.5 [PH] (ref 5–8)
PLATELET # BLD AUTO: 248 10(3)UL (ref 150–450)
POTASSIUM SERPL-SCNC: 4.2 MMOL/L (ref 3.5–5.1)
PROT SERPL-MCNC: 7.2 G/DL (ref 5.7–8.2)
PROT UR-MCNC: NEGATIVE MG/DL
PSA SERPL-MCNC: <0.01 NG/ML (ref ?–4)
Q-T INTERVAL: 474 MS
QRS DURATION: 82 MS
QTC CALCULATION (BEZET): 423 MS
R AXIS: 0 DEGREES
RBC # BLD AUTO: 4.49 X10(6)UL
SODIUM SERPL-SCNC: 138 MMOL/L (ref 136–145)
SP GR UR STRIP: 1.02 (ref 1–1.03)
T AXIS: 43 DEGREES
TRIGL SERPL-MCNC: 100 MG/DL (ref 30–149)
TSI SER-ACNC: 3.92 MIU/ML (ref 0.55–4.78)
UROBILINOGEN UR STRIP-ACNC: NORMAL
VENTRICULAR RATE: 48 BPM
VIT D+METAB SERPL-MCNC: 27.1 NG/ML (ref 30–100)
VLDLC SERPL CALC-MCNC: 19 MG/DL (ref 0–30)
WBC # BLD AUTO: 4.8 X10(3) UL (ref 4–11)

## 2024-05-15 PROCEDURE — 83036 HEMOGLOBIN GLYCOSYLATED A1C: CPT | Performed by: FAMILY MEDICINE

## 2024-05-15 PROCEDURE — 93005 ELECTROCARDIOGRAM TRACING: CPT

## 2024-05-15 PROCEDURE — 84443 ASSAY THYROID STIM HORMONE: CPT | Performed by: FAMILY MEDICINE

## 2024-05-15 PROCEDURE — 84153 ASSAY OF PSA TOTAL: CPT | Performed by: FAMILY MEDICINE

## 2024-05-15 PROCEDURE — 99499 UNLISTED E&M SERVICE: CPT | Performed by: FAMILY MEDICINE

## 2024-05-15 PROCEDURE — 80061 LIPID PANEL: CPT | Performed by: FAMILY MEDICINE

## 2024-05-15 PROCEDURE — 80053 COMPREHEN METABOLIC PANEL: CPT | Performed by: FAMILY MEDICINE

## 2024-05-15 PROCEDURE — 96160 PT-FOCUSED HLTH RISK ASSMT: CPT | Performed by: FAMILY MEDICINE

## 2024-05-15 PROCEDURE — 93010 ELECTROCARDIOGRAM REPORT: CPT | Performed by: STUDENT IN AN ORGANIZED HEALTH CARE EDUCATION/TRAINING PROGRAM

## 2024-05-15 PROCEDURE — 81003 URINALYSIS AUTO W/O SCOPE: CPT | Performed by: FAMILY MEDICINE

## 2024-05-15 PROCEDURE — 85025 COMPLETE CBC W/AUTO DIFF WBC: CPT | Performed by: FAMILY MEDICINE

## 2024-05-15 PROCEDURE — 36415 COLL VENOUS BLD VENIPUNCTURE: CPT | Performed by: FAMILY MEDICINE

## 2024-05-15 PROCEDURE — G0439 PPPS, SUBSEQ VISIT: HCPCS | Performed by: FAMILY MEDICINE

## 2024-05-15 PROCEDURE — 82306 VITAMIN D 25 HYDROXY: CPT

## 2024-05-15 RX ORDER — SILDENAFIL CITRATE 20 MG/1
TABLET ORAL
COMMUNITY

## 2024-05-15 RX ORDER — ALFUZOSIN HYDROCHLORIDE 10 MG/1
TABLET, EXTENDED RELEASE ORAL
COMMUNITY

## 2024-05-15 RX ORDER — FLUTICASONE PROPIONATE 50 MCG
2 SPRAY, SUSPENSION (ML) NASAL DAILY
Qty: 1 EACH | Refills: 3 | Status: SHIPPED | OUTPATIENT
Start: 2024-05-15

## 2024-05-15 RX ORDER — GABAPENTIN 300 MG/1
300 CAPSULE ORAL NIGHTLY
COMMUNITY
Start: 2023-12-01 | End: 2024-05-15

## 2024-05-15 RX ORDER — ALFUZOSIN HYDROCHLORIDE 10 MG/1
TABLET, EXTENDED RELEASE ORAL
COMMUNITY
Start: 2024-02-27 | End: 2024-05-15

## 2024-05-15 RX ORDER — L-METHYLFOLATE-ALGAE-VIT B12-B6 CAP 3-90.314-2-35 MG 3-90.314-2-35 MG
2 CAP ORAL DAILY
Qty: 180 CAPSULE | Refills: 5 | Status: SHIPPED | OUTPATIENT
Start: 2024-05-15 | End: 2024-06-14

## 2024-05-15 RX ORDER — SILODOSIN 8 MG/1
8 CAPSULE ORAL DAILY
Qty: 90 CAPSULE | Refills: 3 | Status: SHIPPED | OUTPATIENT
Start: 2024-05-15 | End: 2025-05-15

## 2024-05-15 NOTE — PROGRESS NOTES
Subjective:   Oscar Dumont is a 79 year old male who presents for a Medicare Subsequent Annual Wellness visit (Pt already had Initial Annual Wellness) and scheduled follow up of multiple significant but stable problems.     Oscar Dumont is a 79 year old male with hx prostate cancer, who is here for routine periodic health screening and examination.  His last physical exam was 2 years ago.  His last ECG was 1 years ago and was normal. His last diabetes screening test was 1 years ago and was abnormal: prediabetes.   His last cholesterol test was 1 year ago and was abnormal: high cholesterol.  Last dentist visit was recently. He is current with his Td immunization, 2016. Already received his COVID vaccines. Patient last colonoscopy was 4 years ago.     History/Other:   Fall Risk Assessment:   He has been screened for Falls and is High Risk. Fall Prevention information provided to patient in After Visit Summary.    Do you feel unsteady when standing or walking?: Yes  Do you worry about falling?: Yes  Have you fallen in the past year?: No     Cognitive Assessment:   He had a completely normal cognitive assessment - see flowsheet entries     Functional Ability/Status:   Oscar Dumont has a completely normal functional assessment. See flowsheet for details.      Depression Screening (PHQ-2/PHQ-9): PHQ-2 SCORE: 0  , done 5/15/2024             Advanced Directives:   He does have a Living Will but we do NOT have it on file in Epic.    He does have a POA but we do NOT have it on file in Epic.    Discussed Advance Care Planning with patient (and family/surrogate if present). Standard forms made available to patient in After Visit Summary.      Patient Active Problem List   Diagnosis    Diverticulosis of large intestine without hemorrhage    Meniere disease    Colon polyp    Pure hypercholesterolemia    Screen for colon cancer    CKD (chronic kidney disease) stage 3, GFR 30-59 ml/min ([facility])    Syncope and collapse     Sinus bradycardia    Prostate cancer (HCC)     Allergies:  He has No Known Allergies.    Current Medications:  Outpatient Medications Marked as Taking for the 5/15/24 encounter (Office Visit) with Jay Jay Bundy MD   Medication Sig    alfuzosin ER 10 MG Oral Tablet 24 Hr TOME SHERI TABLETA TODOS LOS D AS CON EL DESAYUNO    gabapentin 300 MG Oral Cap Take 1 capsule (300 mg total) by mouth nightly.       Medical History:  He  has a past medical history of Anxiety, Cancer (), Colon polyps (), Diverticulosis (), Hearing impairment, High cholesterol, Meniere disease (), and Onychomycosis ().  Surgical History:  He  has a past surgical history that includes tonsillectomy; adenoidectomy; vasectomy; colonoscopy; colonoscopy (); and colonoscopy (N/A, 2018).   Family History:  His family history includes Dementia in his mother; Diabetes in his brother and sister; Hypertension in his brother; Lipids in his brother.  Social History:  He  reports that he quit smoking about 66 years ago. His smoking use included cigarettes. He has never used smokeless tobacco. He reports that he does not currently use alcohol. He reports that he does not use drugs.    Tobacco:  He smoked tobacco in the past but quit greater than 12 months ago.  Social History     Tobacco Use   Smoking Status Former    Current packs/day: 0.00    Types: Cigarettes    Quit date: 1957    Years since quittin.5   Smokeless Tobacco Never        CAGE Alcohol Screen:   CAGE screening score of 0 on 5/15/2024, showing low risk of alcohol abuse.      Patient Care Team:  Jay Jay Bundy MD as PCP - General (Family Practice)    Review of Systems   Constitutional:  Negative for appetite change, fatigue and fever.   HENT:  Negative for hearing loss and nosebleeds.    Eyes:  Negative for pain and visual disturbance.   Respiratory:  Negative for apnea and shortness of breath.    Cardiovascular:  Negative for chest pain, palpitations and  leg swelling.   Gastrointestinal:  Negative for abdominal pain, blood in stool, constipation, diarrhea, nausea and vomiting.   Endocrine: Negative for polydipsia and polyuria.   Genitourinary:  Negative for decreased urine volume, frequency and hematuria.        No nocturia   Musculoskeletal:  Negative for arthralgias.   Skin:  Negative for rash.   Neurological:  Negative for dizziness, syncope and headaches.   Psychiatric/Behavioral:  Negative for dysphoric mood and sleep disturbance.           Objective:   Physical Exam  Vitals reviewed.   Constitutional:       Appearance: Normal appearance.      Comments:      HENT:      Head: Normocephalic.      Right Ear: Hearing, tympanic membrane and ear canal normal.      Left Ear: Hearing, tympanic membrane and ear canal normal.      Nose: Nose normal. No rhinorrhea.      Mouth/Throat:      Mouth: Mucous membranes are moist.      Pharynx: Oropharynx is clear.   Eyes:      Extraocular Movements: Extraocular movements intact.      Conjunctiva/sclera: Conjunctivae normal.      Pupils: Pupils are equal, round, and reactive to light.   Neck:      Thyroid: No thyroid mass.      Vascular: No carotid bruit.   Cardiovascular:      Rate and Rhythm: Normal rate and regular rhythm.      Heart sounds: Normal heart sounds, S1 normal and S2 normal. No murmur heard.  Pulmonary:      Effort: Pulmonary effort is normal.      Breath sounds: Normal breath sounds.   Abdominal:      General: Abdomen is flat. Bowel sounds are normal.      Palpations: Abdomen is soft. There is no hepatomegaly, splenomegaly or mass.      Tenderness: There is no abdominal tenderness.      Hernia: No hernia is present.   Musculoskeletal:      Cervical back: Neck supple.      Comments: Spinal exam without scoliosis.      Lymphadenopathy:      Cervical: No cervical adenopathy.   Skin:     General: Skin is warm.      Findings: No rash.   Neurological:      General: No focal deficit present.      Mental Status: He is  alert.      Deep Tendon Reflexes:      Reflex Scores:       Patellar reflexes are 2+ on the right side and 2+ on the left side.  Psychiatric:         Attention and Perception: Attention normal.         Mood and Affect: Mood and affect normal.          /70   Pulse 52   Wt 199 lb 6.4 oz (90.4 kg)   BMI 27.04 kg/m²  Estimated body mass index is 27.04 kg/m² as calculated from the following:    Height as of 10/20/22: 6' (1.829 m).    Weight as of this encounter: 199 lb 6.4 oz (90.4 kg).    Medicare Hearing Assessment:   Hearing Screening    Screening Method: Questionnaire  I have a problem hearing over the telephone: No I have trouble following the conversations when two or more people are talking at the same time: No   I have trouble understanding things on the TV: No I have to strain to understand conversations: No   I have to worry about missing the telephone ring or doorbell: No I have trouble hearing conversations in a noisy background such as a crowded room or restaurant: No   I get confused about where sounds come from: No I misunderstand some words in a sentence and need to ask people to repeat themselves: No   I especially have trouble understanding the speech of women and children: No I have trouble understanding the speaker in a large room such as at a meeting or place of [employer]: No   Many people I talk to seem to mumble (or don't speak clearly): No People get annoyed because I misunderstand what they say: No   I misunderstand what others are saying and make inappropriate responses: No I avoid social activities because I cannot hear well and fear I will reply improperly: No   Family members and friends have told me they think I may have hearing loss: No             Visual Acuity:   Right Eye Visual Acuity: Uncorrected Right Eye Chart Acuity: 20/20   Left Eye Visual Acuity: Uncorrected Left Eye Chart Acuity: 20/20   Both Eyes Visual Acuity: Uncorrected Both Eyes Chart Acuity: 20/20   Able To Tolerate  Visual Acuity: Yes        Assessment & Plan:   Oscar Dumont is a 79 year old male who presents for a Medicare Assessment.     There are no diagnoses linked to this encounter.    1. Medicare annual wellness visit, subsequent      CPE PLAN:    LABS / TEST & ORDERS for today's visit :Blood test(s) ordered today for send out to [facility] lab:  Orders Placed This Encounter   Procedures    CBC With Differential With Platelet    Comp Metabolic Panel (14)    Hemoglobin A1C    Lipid Panel    PSA, Total W Reflex To Free    TSH W Reflex To Free T4    Vitamin D    Urinalysis with Culture Reflex   Referrals: NEURO - INTERNAL  ENT - EXTERNAL  GASTRO - INTERNAL  In-House; Urine dip.  Test/Procedures done today include: EKG.    IMMUNIZATIONS: none given today.    RECOMMENDATIONS given include: ANTICIPATORY GUIDANCE  topics covered today include: safety (i.e. seat belts, helmets, sunscreen, protective sports gear ), nutrition (i.e. healthy meals and snacks (i.e. avoid junk food and high-carbohydrate foods); athletic conditioning, fluids; low fat milk, limit to less than 20 oz. a day; dental care ), and Healthy habits& Social competence & Responsibilities: Recommendations on physical activity; exercise daily or at least 3 times a week for 30-60 minutes doing cardiovascular exercise. Encourage to maintain the best physical and dental hygiene possible. FOLLOW-UP: Schedule a follow-up visit in 12 months.       2. Prostate cancer (HCC)  Stable  CPM  Follow up [facility] with Urologist  Lab:   - CBC With Differential With Platelet  - PSA, Total W Reflex To Free  - Urinalysis with Culture Reflex    3. Erectile dysfunction, unspecified erectile dysfunction type  CPM    4. Pure hypercholesterolemia  Stable  CPM  Follow up [facility]  Lab:   - Comp Metabolic Panel (14)  - Lipid Panel  - TSH W Reflex To Free T4    5. Elevated LFTs  Lab: CMP    6. Prediabetes  Lab: Hemoglobin A1C    7. Colon cancer screening  8. Polyp of sigmoid colon, unspecified  type  Referral :  GASTRO - INTERNAL    9. Diverticulosis of large intestine without hemorrhage  Asymptomatic    10. Primary osteoarthritis involving multiple joints  Stable  CPM  Follow up KPA    11. Meniere's disease, unspecified laterality  Referral:  ENT - EXTERNAL    12. Mixed conductive and sensorineural hearing loss of both ears  As above    13. Other polyneuropathy  Referral:  NEURO - INTERNAL    14. Hypovitaminosis D  Lab:  Vitamin D; Future    15. Risk for falls  Mild risk    16. Seasonal allergic rhinitis due to pollen  CPM,  Refills: Flonase nasal spray     The patient indicates understanding of these issues and agrees to the plan.  Consult ordered.  Further testing ordered.  Imaging studies ordered.  Lab work ordered.  Reinforced healthy diet, lifestyle, and exercise.      No follow-ups on file.     NEIL CLAROS MD, 5/15/2024     Supplementary Documentation:   General Health:  In the past six months, have you lost more than 10 pounds without trying?: 2 - No  Has your appetite been poor?: No  Type of Diet: Balanced  How does the patient maintain a good energy level?: Appropriate Exercise;Daily Walks  How would you describe your daily physical activity?: Moderate  How would you describe your current health state?: Fair  How do you maintain positive mental well-being?: Social Interaction  On a scale of 0 to 10, with 0 being no pain and 10 being severe pain, what is your pain level?: 0 - (None)  In the past six months, have you experienced urine leakage?: 0-No  At any time do you feel concerned for the safety/well-being of yourself and/or your children, in your home or elsewhere?: No  Have you had any immunizations at another office such as Influenza, Hepatitis B, Tetanus, or Pneumococcal?: No        Oscar Dumont's SCREENING SCHEDULE   Tests on this list are recommended by your physician but may not be covered, or covered at this frequency, by your insurer.   Please check with your insurance carrier  before scheduling to verify coverage.   PREVENTATIVE SERVICES FREQUENCY &  COVERAGE DETAILS LAST COMPLETION DATE   Diabetes Screening    Fasting Blood Sugar / Glucose    One screening every 12 months if never tested or if previously tested but not diagnosed with pre-diabetes   One screening every 6 months if diagnosed with pre-diabetes Lab Results   Component Value Date    GLU 82 08/06/2021        Cardiovascular Disease Screening    Lipid Panel  Cholesterol  Lipoprotein (HDL)  Triglycerides Covered every 5 years for all Medicare beneficiaries without apparent signs or symptoms of cardiovascular disease Lab Results   Component Value Date    CHOLEST 173 08/06/2021    HDL 39 (L) 08/06/2021     (H) 08/06/2021    TRIG 98 08/06/2021         Electrocardiogram (EKG)   Covered if needed at Welcome to Medicare, and non-screening if indicated for medical reasons 10/20/2022      Ultrasound Screening for Abdominal Aortic Aneurysm (AAA) Covered once in a lifetime for one of the following risk factors    Men who are 65-75 years old and have ever smoked    Anyone with a family history -     Colorectal Cancer Screening  Covered for ages 50-85; only need ONE of the following:    Colonoscopy   Covered every 10 years    Covered every 2 years if patient is at high risk or previous colonoscopy was abnormal 05/01/2018    Health Maintenance   Topic Date Due    Colorectal Cancer Screening  05/01/2023       Flexible Sigmoidoscopy   Covered every 4 years -    Fecal Occult Blood Test Covered annually -   Prostate Cancer Screening    Prostate-Specific Antigen (PSA) Annually Lab Results   Component Value Date    PSA 0.01 08/06/2021     There are no preventive care reminders to display for this patient.   Immunizations    Influenza Covered once per flu season  Please get every year 09/18/2023  No recommendations at this time    Pneumococcal Each vaccine ([id number] & [id number]) covered once after 65 Prevnar 13: 03/16/2019    [id number]:  02/22/2017     No recommendations at this time    Hepatitis B One screening covered for patients with certain risk factors   -  No recommendations at this time    Tetanus Toxoid Not covered by Medicare Part B unless medically necessary (cut with metal); may be covered with your pharmacy prescription benefits 04/29/2006    Tetanus, Diptheria and Pertusis TD and TDaP Not covered by Medicare Part B -  No recommendations at this time    Zoster Not covered by Medicare Part B; may be covered with your pharmacy  prescription benefits 02/17/2016  Zoster Vaccines(1 of 2) due on 04/13/2016     Annual Monitoring of Persistent Medications (ACE/ARB, digoxin diuretics, anticonvulsants)    Potassium Annually Lab Results   Component Value Date    K 3.5 08/06/2021         Creatinine   Annually Lab Results   Component Value Date    CREATSERUM 0.90 08/06/2021         BUN Annually Lab Results   Component Value Date    BUN 14 08/06/2021       Drug Serum Conc Annually No results found for: \"DIGOXIN\", \"DIG\", \"VALP\"

## 2024-05-16 ENCOUNTER — TELEPHONE (OUTPATIENT)
Facility: CLINIC | Age: 79
End: 2024-05-16

## 2024-05-16 DIAGNOSIS — Z12.11 SCREEN FOR COLON CANCER: Primary | ICD-10-CM

## 2024-05-16 RX ORDER — ERGOCALCIFEROL 1.25 MG/1
50000 CAPSULE ORAL WEEKLY
Qty: 12 CAPSULE | Refills: 3 | Status: SHIPPED | OUTPATIENT
Start: 2024-05-16 | End: 2025-05-16

## 2024-05-16 RX ORDER — ATORVASTATIN CALCIUM 10 MG/1
10 TABLET, FILM COATED ORAL NIGHTLY
Qty: 90 TABLET | Refills: 3 | Status: SHIPPED | OUTPATIENT
Start: 2024-05-16

## 2024-05-16 NOTE — TELEPHONE ENCOUNTER
Dr. Flowers,    Patient was scheduled for procedure last year but cancelled due to travel plans. Is it ok to use same orders or need to be seen in office?    Donya Wilcox, APRN       5/24/23  2:42 PM  Note  Scheduling:  wallace w/ mello w/ Dr. Flowers  Dx: crc screening  trilyte split dose sent e-scribe

## 2024-05-16 NOTE — TELEPHONE ENCOUNTER
Patient states that he would like to schedule colonoscopy procedure. Patient states that his last procedure in September 2023 was cancelled, since he had to leave to [place]. Patient states that he will be in [place] again this year from 7/6/2024 through 8/17/24. Should the patient be seen in office or just schedule his procedure?

## 2024-05-17 ENCOUNTER — TELEPHONE (OUTPATIENT)
Dept: FAMILY MEDICINE CLINIC | Facility: CLINIC | Age: 79
End: 2024-05-17

## 2024-05-17 NOTE — TELEPHONE ENCOUNTER
Left Message To Call Back with assist of Language Line #[phone] to clarify if patient meant pregabalin

## 2024-05-17 NOTE — TELEPHONE ENCOUNTER
Patient wanted to know if he take his cholesterol medication with trigabain, and celebrex. patient does not know the name of cholesterol medication. Please advise     Current Outpatient Medications   Medication Sig Dispense Refill    ergocalciferol 1.25 MG (77015 UT) Oral Cap Take 1 capsule (50,000 Units total) by mouth once a week. 12 capsule 3    atorvastatin 10 MG Oral Tab Take 1 tablet (10 mg total) by mouth nightly. 90 tablet 3    alfuzosin ER 10 MG Oral Tablet 24 Hr TOME SHERI TABLETA TODOS LOS D AS CON EL DESAYUNO      Cholecalciferol 50 MCG (2000 UT) Oral Tab Take 50 mcg by mouth daily.      sildenafil 20 MG Oral Tab TAKE 1 TO 2 TABLETS BY MOUTH ONE HOUR PRIOR TO SEXUAL ACTIVITY      L-Methylfolate-Algae-B12-B6 (METANX) 3-90.314-2-35 MG Oral Cap Take 2 capsules by mouth daily. 180 capsule 5    silodosin (RAPAFLO) 8 MG Oral Cap Take 1 capsule (8 mg total) by mouth daily. 90 capsule 3    fluticasone propionate 50 MCG/ACT Nasal Suspension 2 sprays by Each Nare route daily. 1 each 3    pregabalin 50 MG Oral Cap Take 1 capsule (50 mg total) by mouth every 12 (twelve) hours. 60 capsule 1    celecoxib (CELEBREX) 200 MG Oral Cap Take 1 capsule (200 mg total) by mouth every 12 (twelve) hours as needed for Pain. 60 capsule 1    triamterene-hydroCHLOROthiazide (DYAZIDE) 37.5-25 MG Oral Cap Take 1 capsule by mouth every morning. (Patient not taking: Reported on 9/18/2023) 90 capsule 1

## 2024-05-20 NOTE — TELEPHONE ENCOUNTER
Language line Allen ID # [id number]- Left message to call back.    We tried home number and someone answered in English. He hung up when  introduced himself.     RN called patient back without  and delivered message below. No other questions.

## 2024-05-20 NOTE — TELEPHONE ENCOUNTER
Patient states he was recently prescribed Atorvastatin 10mg.   Patient would like to know if this medication is ok to take along with Pregabalin 50mg, Celebrex 200mg, and the rest of his medication regimen.

## 2024-05-30 NOTE — TELEPHONE ENCOUNTER
Patient returned call. Gi not available patient states [place] from [place] 6th to August 17th please call

## 2024-05-31 NOTE — TELEPHONE ENCOUNTER
Scheduled for:  Colonoscopy [id number]  Provider Name:  Dr. Flowers   Date:  11/5/2024  Location:    [facility]   Sedation:  mac   Time:  12:30 (patient is aware arrival time is 11:30)  Prep:  trilyte   Meds/Allergies Reconciled?:  Physician reviewed   Diagnosis with codes:  Colon cancer screening Z12.11  Was patient informed to call insurance with codes (Y/N):  Yes, I confirmed [employer] medicare  insurance with the patient.   Referral sent?:  Referral was sent at the time of electronic surgical scheduling.  [facility] or [facility] notified?:  I sent an electronic request to Endo Scheduling and received a confirmation today.   Medication Orders:  This patient verbally confirmed that he is not taking:   Iron, blood thinners, BP meds, and is not diabetic   Not latex allergy, Not PCN allergy and does not have a pacemaker  Misc Orders:  I discussed the prep instructions with the patient which he  verbally understood and is aware that I will mail the instructions today.  Further instructions given by staff:

## 2024-06-13 ENCOUNTER — TELEPHONE (OUTPATIENT)
Dept: FAMILY MEDICINE CLINIC | Facility: CLINIC | Age: 79
End: 2024-06-13

## 2024-06-13 DIAGNOSIS — G62.9 PERIPHERAL NERVE DISORDER: Primary | ICD-10-CM

## 2024-06-13 NOTE — TELEPHONE ENCOUNTER
Patients requesting all TSH, B12 and all Lab test results be sent to Dr Rodrigo Silva [facility] fax [phone]

## 2024-06-17 ENCOUNTER — MED REC SCAN ONLY (OUTPATIENT)
Dept: FAMILY MEDICINE CLINIC | Facility: CLINIC | Age: 79
End: 2024-06-17

## 2024-08-14 RX ORDER — FLUTICASONE PROPIONATE 50 MCG
SPRAY, SUSPENSION (ML) NASAL
Qty: 48 ML | Refills: 3 | Status: SHIPPED | OUTPATIENT
Start: 2024-08-14

## 2024-08-14 NOTE — TELEPHONE ENCOUNTER
Refill passed per [facility] protocol.  Requested Prescriptions   Pending Prescriptions Disp Refills    FLUTICASONE PROPIONATE 50 MCG/ACT Nasal Suspension [Pharmacy Med Name: FLUTICASONE PROP 50 MCG SPRAY] 48 mL 1     Sig: ROCIAR 2 VECES EN CADA FOSA NASAL TODOS LOS JAIME       Allergy Medication Protocol Passed - 8/11/2024  7:04 AM        Passed - In person appointment or virtual visit in the past 12 mos or appointment in next 3 mos     Recent Outpatient Visits              3 [facility]    11 months ago [facility] cancer [facility]    [facility]    1 year ago Medicare annual wellness visit, [facility]    3 years ago Medicare annual wellness visit, [facility]    [facility]    3 years ago Elevated PSA    [facility]    Office Visit          Future Appointments         Provider Department Appt Notes    In 1 week [facility] MRI 2 (3T WIDE) [facility]     In 1 week [facility] MRI RM2 (3T WIDE) [facility]     In 2 months SHIELA, PROC[facility] mac @ [facility]                       Recent Outpatient Visits              3 months ago Medicare annual wellness visit, subsequent    [facility]    [facility]    11 months ago [facility] cancer [facility]    [facility]    1 year ago Medicare annual wellness visit, subsequent    [facility]     Office Visit    3 years ago Medicare annual wellness visit, subsequent    [facility] [address], Jay Jay Dailey MD    Office Visit    3 years ago [facility]    Office Visit          Future Appointments         Provider Department Appt Notes    In 1 week [facility] MRI 2 (3T WIDE) [facility]     In 1 week [facility] MRI 2 (3T WIDE) [facility]     In 2 months SHIELA, PROCEDURE [facility] Colon w/ mac @ [facility]

## 2024-08-21 ENCOUNTER — HOSPITAL ENCOUNTER (OUTPATIENT)
Dept: MRI IMAGING | Facility: HOSPITAL | Age: 79
Discharge: HOME | End: 2024-08-21
Attending: Other
Payer: MEDICARE

## 2024-08-21 DIAGNOSIS — M54.12 BRACHIAL NEURITIS: ICD-10-CM

## 2024-08-21 DIAGNOSIS — M54.16 LUMBAR RADICULOPATHY: ICD-10-CM

## 2024-08-21 PROCEDURE — 72148 MRI LUMBAR SPINE W/O DYE: CPT | Performed by: OTHER

## 2024-08-21 PROCEDURE — 72141 MRI NECK SPINE W/O DYE: CPT | Performed by: OTHER

## 2024-09-04 ENCOUNTER — TELEPHONE (OUTPATIENT)
Dept: FAMILY MEDICINE CLINIC | Facility: CLINIC | Age: 79
End: 2024-09-04

## 2024-09-05 NOTE — TELEPHONE ENCOUNTER
ID [id number]   started to leave a message for the patient to call back but female picked up half way through call and when trying to explain who was calling, person who picked up disconnected the call.     Will need to call back tomorrow to relay message below.    Results should come from ordering provider as well, please inform.

## 2024-09-06 NOTE — TELEPHONE ENCOUNTER
Patient was called and inform of  message below. Patient then mentioned to me that  spoke to him yesterday.    Future Appointments   Date Time Provider Department Center   11/5/2024 12:30 PM SHIELA, PROCEDURE [id number] None

## 2024-10-16 DIAGNOSIS — G62.9 PERIPHERAL POLYNEUROPATHY: ICD-10-CM

## 2024-10-16 DIAGNOSIS — M19.90 ARTHRITIS: ICD-10-CM

## 2024-10-18 RX ORDER — PREGABALIN 50 MG/1
50 CAPSULE ORAL EVERY 12 HOURS
Qty: 60 CAPSULE | Refills: 1 | Status: SHIPPED | OUTPATIENT
Start: 2024-10-18

## 2024-10-18 NOTE — TELEPHONE ENCOUNTER
Routing to podmate due to High Priority status and Dr. Bundy is out of office.    Please review.  Protocol failed / Has no protocol.    Marked High Priority, patient states out of medication  Pregabalin (Lyrica) 50mg Recent fills each quantity 60 : 5/10, 6/11, 8/24  Last prescription written: 5/10/24  Last office visit: 5/15/24     Requested Prescriptions   Pending Prescriptions Disp Refills    PREGABALIN 50 MG Oral Cap [Pharmacy Med Name: PREGABALIN 50 MG CAPSULE] 60 capsule 1     Sig: Take 1 capsule (50 mg total) by mouth every 12 (twelve) hours.       Controlled Substance Medication Failed - 10/18/2024 10:58 AM        Failed - This medication is a controlled substance - forward to provider to refill       Neurology Medications Passed - 10/18/2024 10:58 AM        Passed - In person appointment or virtual visit in the past 6 mos or appointment in next 3 mos     Recent Outpatient Visits              5 months ago Medicare annual wellness visit, subsequent    [facility], Jay Jay Dailey MD    Office Visit    1 year ago Colon cancer screening    [facility], Jay Jay Dailey MD    Office Visit    1 year ago Medicare annual wellness visit, subsequent    [facility], Jay Jay Dailey MD    Office Visit    3 years ago Medicare annual wellness visit, subsequent    [facility], Jay Jay Dailey MD    Office Visit    3 years ago Elevated PSA    [facility], Edyta Gee MD    Office Visit          Future Appointments         Provider Department Appt Notes    In 2 weeks OSWALDO KUO [facility], Lorena Colon w/ mac @ [facility]                       Future Appointments         Provider Department Appt Notes    In 2 weeks OSWALDO KUO [facility],  Lorena Colon w/ mac @ [facility]          Recent Outpatient Visits              5 months ago Medicare annual wellness visit, subsequent    [facility] [address], Jay Jay Dailey MD    Office Visit    1 year ago Colon cancer screening    [facility] [address], Jay Jay Dailey MD    Office Visit    1 year ago Medicare annual wellness visit, subsequent    [facility] [address], Jay Jay Dailey MD    Office Visit    3 years ago Medicare annual wellness visit, subsequent    [facility] [address], Jay Jay Dailey MD    Office Visit    3 years ago Elevated PSA    [facility], Edyta Gee MD    Office Visit

## 2024-10-18 NOTE — TELEPHONE ENCOUNTER
Verified name and .    Ned from [facility] calling to follow up on refill request- states that patient is out of medication now.

## 2024-11-04 ENCOUNTER — TELEPHONE (OUTPATIENT)
Facility: CLINIC | Age: 79
End: 2024-11-04

## 2024-11-04 RX ORDER — MULTIVIT-MIN/IRON FUM/FOLIC AC 7.5 MG-4
1 TABLET ORAL DAILY
COMMUNITY

## 2024-11-04 NOTE — PAT NURSING NOTE
Patient instructed to hold Sildenafil three days prior to procedure per the Pre-surgical testing policy.

## 2024-11-04 NOTE — TELEPHONE ENCOUNTER
Called patient - went over prep instructions and how to drink the bowel prep.    No further questions at this time - patient already received a call from endoscopy regarding his procedure time.

## 2024-11-05 ENCOUNTER — ANESTHESIA (OUTPATIENT)
Dept: ENDOSCOPY | Facility: HOSPITAL | Age: 79
End: 2024-11-05
Payer: MEDICARE

## 2024-11-05 ENCOUNTER — ANESTHESIA EVENT (OUTPATIENT)
Dept: ENDOSCOPY | Facility: HOSPITAL | Age: 79
End: 2024-11-05
Payer: MEDICARE

## 2024-11-05 ENCOUNTER — HOSPITAL ENCOUNTER (OUTPATIENT)
Facility: HOSPITAL | Age: 79
Setting detail: HOSPITAL OUTPATIENT SURGERY
Discharge: HOME | End: 2024-11-05
Attending: INTERNAL MEDICINE | Admitting: INTERNAL MEDICINE
Payer: MEDICARE

## 2024-11-05 VITALS
SYSTOLIC BLOOD PRESSURE: 132 MMHG | WEIGHT: 200 LBS | RESPIRATION RATE: 11 BRPM | DIASTOLIC BLOOD PRESSURE: 73 MMHG | OXYGEN SATURATION: 99 % | HEART RATE: 47 BPM | BODY MASS INDEX: 27.09 KG/M2 | HEIGHT: 72 IN

## 2024-11-05 DIAGNOSIS — Z12.11 SCREEN FOR COLON CANCER: ICD-10-CM

## 2024-11-05 PROBLEM — K64.8 INTERNAL HEMORRHOIDS: Status: ACTIVE | Noted: 2024-11-05

## 2024-11-05 PROCEDURE — 45380 COLONOSCOPY AND BIOPSY: CPT | Performed by: INTERNAL MEDICINE

## 2024-11-05 PROCEDURE — 0DBK8ZX EXCISION OF ASCENDING COLON, VIA NATURAL OR ARTIFICIAL OPENING ENDOSCOPIC, DIAGNOSTIC: ICD-10-PCS | Performed by: INTERNAL MEDICINE

## 2024-11-05 RX ORDER — SODIUM CHLORIDE, SODIUM LACTATE, POTASSIUM CHLORIDE, CALCIUM CHLORIDE 600; 310; 30; 20 MG/100ML; MG/100ML; MG/100ML; MG/100ML
INJECTION, SOLUTION INTRAVENOUS CONTINUOUS
Status: DISCONTINUED | OUTPATIENT
Start: 2024-11-05 | End: 2024-11-05

## 2024-11-05 RX ORDER — NALOXONE HYDROCHLORIDE 0.4 MG/ML
0.08 INJECTION, SOLUTION INTRAMUSCULAR; INTRAVENOUS; SUBCUTANEOUS ONCE AS NEEDED
Status: DISCONTINUED | OUTPATIENT
Start: 2024-11-05 | End: 2024-11-05

## 2024-11-05 RX ORDER — LIDOCAINE HYDROCHLORIDE 10 MG/ML
INJECTION, SOLUTION EPIDURAL; INFILTRATION; INTRACAUDAL; PERINEURAL AS NEEDED
Status: DISCONTINUED | OUTPATIENT
Start: 2024-11-05 | End: 2024-11-05 | Stop reason: SURG

## 2024-11-05 RX ADMIN — SODIUM CHLORIDE, SODIUM LACTATE, POTASSIUM CHLORIDE, CALCIUM CHLORIDE: 600; 310; 30; 20 INJECTION, SOLUTION INTRAVENOUS at 12:36:00

## 2024-11-05 RX ADMIN — LIDOCAINE HYDROCHLORIDE 50 MG: 10 INJECTION, SOLUTION EPIDURAL; INFILTRATION; INTRACAUDAL; PERINEURAL at 12:37:00

## 2024-11-05 RX ADMIN — SODIUM CHLORIDE, SODIUM LACTATE, POTASSIUM CHLORIDE, CALCIUM CHLORIDE: 600; 310; 30; 20 INJECTION, SOLUTION INTRAVENOUS at 13:00:00

## 2024-11-05 NOTE — DISCHARGE INSTRUCTIONS
Home Care Instructions for Colonoscopy with Sedation    Diet:  - Resume your regular diet as tolerated unless otherwise instructed.  - Start with light meals to minimize bloating.  - Do not drink alcohol today.    Medication:  - If you have questions about resuming your normal medications, please contact your Primary Care Physician.    Activities:  - Take it easy today. Do not return to work today.  - Do not drive today.  - Do not operate any machinery today (including kitchen equipment).  -   Do not make any critical decisions or sign any paperwork.  - Do not exercise today.    Colonoscopy:  - You may notice some rectal \"spotting\" (a little blood on the toilet tissue) for a day or two after the exam. This is normal.  - If you experience any rectal bleeding (not spotting), persistent tenderness or sharp severe abdominal pains, oral temperature over 100 degrees Fahrenheit, light-headedness or dizziness, or any other problems, contact your doctor.      **If unable to reach your doctor, please go to the [facility] Emergency Room**    - Your referring physician will receive a full report of your examination.  - If you do not hear from your doctor's office within two weeks of your biopsy, please call them for your results.    You may be able to see your laboratory results in [employer] between 4 and 7 business days.  In some cases, your physician may not have viewed the results before they are released to [employer].  If you have questions regarding your results contact the physician who ordered the test/exam by phone or via [employer] by choosing \"Ask a Medical Question.\"

## 2024-11-05 NOTE — OPERATIVE REPORT
COLONOSCOPY REPORT    Oscar Dumont     1945 Age 79 year old   PCP NEIL CLAROS MD Endoscopist Rosangela Flowers MD     Date of procedure: 24    Procedure: Colonoscopy w/cold biopsy    Pre-operative diagnosis: Screening    Post-operative diagnosis: Polyp(s) of colon, diverticulosis of the colon, internal hemorrhoids    Medications: MAC    Withdrawal time: 19 minutes    Procedure:  Informed consent was obtained from the patient after the risks of the procedure were discussed, including but not limited to bleeding, perforation, aspiration, infection, or possibility of a missed lesion. After discussions of the risks/benefits and alternatives to this procedure, as well as the planned sedation, the patient was placed in the left lateral decubitus position and begun on continuous blood pressure pulse oximetry and EKG monitoring and this was maintained throughout the procedure. Once an adequate level of sedation was obtained a digital rectal exam was completed. Then the lubricated tip of the [id number] diagnostic video colonoscope was inserted and advanced without difficulty to the cecum using the CO2 insufflation technique. The cecum was identified by localizing the trifold, the appendix and the ileocecal valve. Withdrawal was begun with thorough washing and careful examination of the colonic walls and folds. A routine second examination of the cecum/ascending colon was performed. Photodocumentation was obtained. The bowel prep was fair. Views of the colon were fair with washing. I then carefully withdrew the instrument from the patient who tolerated the procedure well.     Complications: none.    Findings:   1. 1 polyp(s) noted as follows:      A. 2 mm polyp in the ascending colon; diminutive morphology; cold biopsy polypectomy and retrieved.    2. Diverticulosis: mild throughout the colon.    3. Terminal ileum: the visualized mucosa appeared normal.    4. The colonic mucosa throughout the  colon showed normal vascular pattern, without evidence of angioectasias or inflammation.     5. A retroflexed view of the rectum revealed small internal hemorrhoids.    6. KEVIN: normal rectal tone, no masses palpated.     Impression:   One small colon poly removed.  Diverticulosis and internal hemorrhoids.    Recommend:  Await pathology. Based on age, co-morbidities and USPSTF guidelines, no need for routine screening unless interested in continuing surveillance. If new signs or symptoms develop, colonoscopy may need to be repeated sooner.   High fiber diet.  Monitor for blood in the stool. If having more than just tinge of blood, call office or go to the ER.    >>>If tissue was obtained and you have not received your pathology results either by phone or letter within 2 weeks, please call our office at [phone].    Specimens: colon  Blood loss: <1 ml      ----------------------------------------------------------------------------------------------------------------------------------    INTERVAL FOR COLONOSCOPY:   - If there is no family history of colon cancer and no colon polyps identified in an adequately prepped colon - colonoscopy should be repeated in 10 years. Various factors should be considered regarding repeat colonoscopy - including co-morbid conditions, ability to tolerate procedure with advanced age, and desire for repeat testing.   - If no colon polyps were identified and a positive family history of colon cancer - the colonoscopy should be repeated in 5 years.   - If colon polyps were removed, the colonoscopy should be repeated sooner depending on size/type/location of polyp.

## 2024-11-05 NOTE — ANESTHESIA PREPROCEDURE EVALUATION
Anesthesia PreOp Note    HPI:     Oscar Dumont is a 79 year old male who presents for preoperative consultation requested by: CLIFF Flowers MD    Date of Surgery: 11/5/2024    Procedure(s):  COLONOSCOPY  Indication: Screen for colon cancer    Relevant Problems   No relevant active problems       NPO:  Last Liquid Consumption Date: 11/05/24  Last Liquid Consumption Time: 0800  Last Solid Consumption Date: 11/04/24  Last Solid Consumption Time: 1300  Last Liquid Consumption Date: 11/05/24          History Review:  Patient Active Problem List    Diagnosis Date Noted    Prostate cancer (HCC) 08/06/2021    Syncope and collapse 03/01/2021    Sinus bradycardia 03/01/2021    CKD (chronic kidney disease) stage 3, GFR 30-59 ml/min (HCC) 03/16/2019    Screen for colon cancer     Diverticulosis of large intestine without hemorrhage 02/17/2016    Meniere disease 02/17/2016    Colon polyp 02/17/2016    Pure hypercholesterolemia 02/17/2016       Past Medical History:    Anxiety    Cancer (HCC)    Colon polyps    no polyps on CLN in 2018, repeat CLN in 4/2023    Diverticulosis    Hearing impairment    right ear, ringing     High cholesterol    Meniere disease    Onychomycosis    toes       Past Surgical History:   Procedure Laterality Date    Adenoidectomy      Colonoscopy      Colonoscopy  2013    colon polyp    Colonoscopy N/A 05/01/2018    Procedure: COLONOSCOPY;  Surgeon: CLIFF Flowers MD;  Location: [facility] ENDOSCOPY    Colonoscopy N/A 11/05/2024    ;    Tonsillectomy      Vasectomy         Prescriptions Prior to Admission[1]  Current Medications and Prescriptions Ordered in Epic[2]    Allergies[3]    Family History   Problem Relation Age of Onset    Dementia Mother         Alzheimer's    Hypertension Brother     Lipids Brother         hyperlipidemia    Diabetes Brother         type 2    Diabetes Sister         type 2     Social History     Socioeconomic History    Marital status:    Tobacco Use    Smoking  status: Former     Current packs/day: 0.00     Types: Cigarettes     Quit date: 1957     Years since quittin.0    Smokeless tobacco: Never   Vaping Use    Vaping status: Never Used   Substance and Sexual Activity    Alcohol use: Not Currently     Alcohol/week: 0.0 standard drinks of alcohol     Comment: drinks alcohol on a social basis only.    Drug use: No   Other Topics Concern    Caffeine Concern Yes       Available pre-op labs reviewed.             Vital Signs:  Body mass index is 27.12 kg/m².   height is 1.829 m (6') and weight is 90.7 kg (200 lb). His blood pressure is 132/65 and his pulse is 57. His respiration is 10 and oxygen saturation is 97%.   Vitals:    24 1420 24 1127   BP:  132/65   Pulse:  57   Resp:  10   SpO2:  97%   Weight: 90.7 kg (200 lb)    Height: 1.829 m (6')         Anesthesia Evaluation     Patient summary reviewed and Nursing notes reviewed    Airway   Mallampati: I  TM distance: >3 FB  Neck ROM: full  Dental      Comment: Upper teeth chipped      Pulmonary - negative ROS and normal exam   Cardiovascular - negative ROS and normal exam  Exercise tolerance: good    NYHA Classification: I  ECG reviewed  Rhythm: regular    Neuro/Psych - negative ROS     GI/Hepatic/Renal    (+) bowel prep    Endo/Other    (+) arthritis  Abdominal  - normal exam    Abdomen: soft.  Bowel sounds: normal.                 Anesthesia Plan:   ASA:  2  Plan:   MAC  Informed Consent Plan and Risks Discussed With:  Patient  Discussed plan with:  CRNA      I have informed Oscar Dumont and/or legal guardian or family member of the nature of the anesthetic plan, benefits, risks including possible dental damage if relevant, major complications, and any alternative forms of anesthetic management.   All of the patient's questions were answered to the best of my ability. The patient desires the anesthetic management as planned.  Rosa Bolanos CRNA  2024 12:18 PM  Present on  Admission:  **None**           [1]   Medications Prior to Admission   Medication Sig Dispense Refill Last Dose/Taking    Multiple Vitamins-Minerals (MULTI-VITAMIN/MINERALS) Oral Tab Take 1 tablet by mouth daily.   11/4/2024    Marilee 250 MG Oral Cap Take by mouth.   11/4/2024    pregabalin 50 MG Oral Cap Take 1 capsule (50 mg total) by mouth every 12 (twelve) hours. 60 capsule 1 11/3/2024    fluticasone propionate 50 MCG/ACT Nasal Suspension ROCIAR 2 VECES EN CADA FOSA NASAL TODOS LOS JAIME 48 mL 3 Taking    ergocalciferol 1.25 MG (82141 UT) Oral Cap Take 1 capsule (50,000 Units total) by mouth once a week. 12 capsule 3 11/4/2024    atorvastatin 10 MG Oral Tab Take 1 tablet (10 mg total) by mouth nightly. 90 tablet 3 11/3/2024    Cholecalciferol 50 MCG (2000 UT) Oral Tab Take 50 mcg by mouth daily.   11/4/2024    sildenafil 20 MG Oral Tab TAKE 1 TO 2 TABLETS BY MOUTH ONE HOUR PRIOR TO SEXUAL ACTIVITY   11/3/2024    celecoxib (CELEBREX) 200 MG Oral Cap Take 1 capsule (200 mg total) by mouth every 12 (twelve) hours as needed for Pain. 60 capsule 1 11/3/2024    alfuzosin ER 10 MG Oral Tablet 24 Hr TOME SHERI TABLETA TODOS LOS D AS CON EL DESAYUNO       silodosin (RAPAFLO) 8 MG Oral Cap Take 1 capsule (8 mg total) by mouth daily. 90 capsule 3     triamterene-hydroCHLOROthiazide (DYAZIDE) 37.5-25 MG Oral Cap Take 1 capsule by mouth every morning. (Patient not taking: Reported on 9/18/2023) 90 capsule 1    [2]   Current Facility-Administered Medications Ordered in Epic   Medication Dose Route Frequency Provider Last Rate Last Admin    lactated ringers infusion   Intravenous Continuous CLIFF Flowers MD         No current [facility]-ordered outpatient medications on file.   [3] No Known Allergies

## 2024-11-05 NOTE — H&P
History & Physical Examination    Patient Name: Oscar Dumont  MRN: [id number]  CSN: [id number]  YOB: 1945    Diagnosis: screening for colon cancer    Prescriptions Prior to Admission[1]  Current Facility-Administered Medications   Medication Dose Route Frequency    lactated ringers infusion   Intravenous Continuous     Facility-Administered Medications Ordered in Other Encounters   Medication Dose Route Frequency    lidocaine PF (Xylocaine-MPF) 1% injection   Intravenous PRN    propofol (Diprivan) 10 MG/ML injection   Intravenous PRN    propofol (Diprivan) 10 mg/mL infusion premix   Intravenous Continuous PRN       Allergies: Allergies[2]    Past Medical History:    Anxiety    Cancer (HCC)    Colon polyps    no polyps on CLN in 2018, repeat CLN in 2023    Diverticulosis    Hearing impairment    right ear, ringing     High cholesterol    Meniere disease    Onychomycosis    toes     Past Surgical History:   Procedure Laterality Date    Adenoidectomy      Colonoscopy      Colonoscopy      colon polyp    Colonoscopy N/A 2018    Procedure: COLONOSCOPY;  Surgeon: CLIFF Flowers MD;  Location: [facility] ENDOSCOPY    Colonoscopy N/A 2024    ; polyp,hemorrhoids,diverticulosis    Tonsillectomy      Vasectomy       Family History   Problem Relation Age of Onset    Dementia Mother         Alzheimer's    Hypertension Brother     Lipids Brother         hyperlipidemia    Diabetes Brother         type 2    Diabetes Sister         type 2     Social History     Tobacco Use    Smoking status: Former     Current packs/day: 0.00     Types: Cigarettes     Quit date: 1957     Years since quittin.0    Smokeless tobacco: Never   Substance Use Topics    Alcohol use: Not Currently     Alcohol/week: 0.0 standard drinks of alcohol     Comment: drinks alcohol on a social basis only.       SYSTEM Check if Review is Normal Check if Physical Exam is Normal If not normal, please explain:   HEENT [X ]  [ X]    NECK  [X ] [ X]    HEART [X ] [ X]    LUNGS [X ] [ X]    ABDOMEN [X ] [ X]    EXTREMITIES [X ] [ X]    OTHER        I have discussed the risks and benefits and alternatives of the procedure with the patient/family.  They understand and agree to proceed with plan of care.   I have reviewed the History and Physical done within the last 30 days.  Any changes noted above.    SOURAV Flowers MD  [facility] - Gastroenterology  11/5/2024  1:02 PM                 [1]   Medications Prior to Admission   Medication Sig Dispense Refill Last Dose/Taking    Multiple Vitamins-Minerals (MULTI-VITAMIN/MINERALS) Oral Tab Take 1 tablet by mouth daily.   11/4/2024    Marilee 250 MG Oral Cap Take by mouth.   11/4/2024    pregabalin 50 MG Oral Cap Take 1 capsule (50 mg total) by mouth every 12 (twelve) hours. 60 capsule 1 11/3/2024    fluticasone propionate 50 MCG/ACT Nasal Suspension ROCIAR 2 VECES EN CADA FOSA NASAL TODOS LOS JAIME 48 mL 3 Taking    ergocalciferol 1.25 MG (16740 UT) Oral Cap Take 1 capsule (50,000 Units total) by mouth once a week. 12 capsule 3 11/4/2024    atorvastatin 10 MG Oral Tab Take 1 tablet (10 mg total) by mouth nightly. 90 tablet 3 11/3/2024    Cholecalciferol 50 MCG (2000 UT) Oral Tab Take 50 mcg by mouth daily.   11/4/2024    sildenafil 20 MG Oral Tab TAKE 1 TO 2 TABLETS BY MOUTH ONE HOUR PRIOR TO SEXUAL ACTIVITY   11/3/2024    celecoxib (CELEBREX) 200 MG Oral Cap Take 1 capsule (200 mg total) by mouth every 12 (twelve) hours as needed for Pain. 60 capsule 1 11/3/2024    alfuzosin ER 10 MG Oral Tablet 24 Hr TOME SHERI TABLETA TODOS LOS D AS CON EL DESAYUNO       silodosin (RAPAFLO) 8 MG Oral Cap Take 1 capsule (8 mg total) by mouth daily. 90 capsule 3     triamterene-hydroCHLOROthiazide (DYAZIDE) 37.5-25 MG Oral Cap Take 1 capsule by mouth every morning. (Patient not taking: Reported on 9/18/2023) 90 capsule 1    [2] No Known Allergies

## 2024-11-05 NOTE — ANESTHESIA POSTPROCEDURE EVALUATION
Patient: Oscar Dumont    Procedure Summary       Date: 11/05/24 Room / Location: [facility] ENDOSCOPY 03 / [facility] ENDOSCOPY    Anesthesia Start: 1234 Anesthesia Stop:     Procedure: COLONOSCOPY Diagnosis:       Screen for colon cancer      (polyp,hemorrhoids,diverticulosis)    Surgeons: CLIFF Flowers MD Anesthesiologist: Rosa Bolanos CRNA    Anesthesia Type: MAC ASA Status: 2            Anesthesia Type: MAC    Vitals Value Taken Time   BP 92/54 11/05/24 1305   Temp 98.2 11/05/24 1307   Pulse 50 11/05/24 1306   Resp 17 11/05/24 1306   SpO2 98 % 11/05/24 1306   Vitals shown include unfiled device data.    [facility] AN Post Evaluation:   Patient Evaluated in PACU  Patient Participation: complete - patient participated  Level of Consciousness: obtunded/minimal responses  Pain Score: 0  Pain Management: adequate  Airway Patency:patent  Dental exam unchanged from preop  Yes    Nausea/Vomiting: none  Cardiovascular Status: acceptable  Respiratory Status: acceptable  Postoperative Hydration acceptable      Rosa Bolanos CRNA  11/5/2024 1:07 PM

## 2024-11-27 ENCOUNTER — TELEPHONE (OUTPATIENT)
Facility: CLINIC | Age: 79
End: 2024-11-27

## 2024-11-27 NOTE — TELEPHONE ENCOUNTER
I mailed out colonoscopy results letter to pt  Updated health maintenance  Colonoscopy done 11/05/2024.     CLIFF Flowers MD  [facility] Clinical Staff  No recall

## 2025-01-18 DIAGNOSIS — M19.90 ARTHRITIS: ICD-10-CM

## 2025-01-18 DIAGNOSIS — G62.9 PERIPHERAL POLYNEUROPATHY: ICD-10-CM

## 2025-01-18 NOTE — TELEPHONE ENCOUNTER
Refill Request:    Current Outpatient Medications   Medication Sig Dispense Refill    pregabalin 50 MG Oral Cap Take 1 capsule (50 mg total) by mouth every 12 (twelve) hours. 60 capsule 1       Message:  \"Patient is requesting a new prescription for pregabalin 50MG\"    Please advise

## 2025-01-21 DIAGNOSIS — G62.9 PERIPHERAL POLYNEUROPATHY: ICD-10-CM

## 2025-01-21 DIAGNOSIS — M19.90 ARTHRITIS: ICD-10-CM

## 2025-01-21 RX ORDER — PREGABALIN 50 MG/1
50 CAPSULE ORAL EVERY 12 HOURS
Qty: 60 CAPSULE | Refills: 1 | Status: SHIPPED | OUTPATIENT
Start: 2025-01-21

## 2025-01-21 RX ORDER — PREGABALIN 50 MG/1
50 CAPSULE ORAL EVERY 12 HOURS
Qty: 60 CAPSULE | Refills: 1 | OUTPATIENT
Start: 2025-01-21

## 2025-01-21 NOTE — TELEPHONE ENCOUNTER
Please review. Protocol Failed; No Protocol    Patient is out of medication and leaving out of town tonight!      [facility] pharmacy calling to check on pregabalin 50 mg  refill request status,verified name and date of birth.  Pharmacy reports that patient is out of medication and is leaving on a trip tonight.  Medication  pended to run through protocol.       Recent fills: 10/22/2024, 11/19/2024  Last Rx written: 10/18/2024  Last office visit: 5/15/2024          Requested Prescriptions   Pending Prescriptions Disp Refills    pregabalin 50 MG Oral Cap 60 capsule 1     Sig: Take 1 capsule (50 mg total) by mouth every 12 (twelve) hours.       Neurology Medications Failed - 1/21/2025  3:41 PM        Failed - In person appointment or virtual visit in the past 6 mos or appointment in next 3 mos     Recent Outpatient Visits              8 months ago Medicare annual wellness visit, subsequent    [facility]    Office Visit    1 year ago Colon cancer screening    [facility] [address], Jay Jay Dailey MD    Office Visit    2 years ago Medicare annual wellness visit, subsequent    [facility]    Office Visit    3 years ago Medicare annual wellness visit, subsequent    [facility] [address], Jay Jay Dailey MD    Office Visit    4 years ago Elevated PSA    [facility], Edyta Gee MD    Office Visit                      Passed - Medication is active on med list       Controlled Substance Medication Failed - 1/21/2025  3:41 PM        Failed - This medication is a controlled substance - forward to provider to refill        Passed - Medication is active on med list                 Recent Outpatient Visits              8 months ago Medicare annual wellness visit, subsequent    [facility],  [facility], Jay Jay Dailey MD    Office Visit    1 year ago Colon cancer screening    [facility] [place] Ricardo, [place]    Office Visit    2 years ago Medicare annual wellness visit, subsequent    [facility] [address], Jay Jay Dailey MD    Office Visit    3 years ago Medicare annual wellness visit, subsequent    [facility] [address], Jay Jay Dailey MD    Office Visit    4 years ago Elevated PSA    [facility], Edyta Gee MD    Office Visit

## 2025-01-21 NOTE — TELEPHONE ENCOUNTER
[facility] pharmacy calling to check on pregabalin 50 mg  refill request status,verified name and date of birth.  Pharmacy reports that patient is out of medication and is leaving on a trip tonight.  Medication  pended to run through protocol.

## 2025-03-11 ENCOUNTER — TELEPHONE (OUTPATIENT)
Dept: FAMILY MEDICINE CLINIC | Facility: CLINIC | Age: 80
End: 2025-03-11

## 2025-03-11 NOTE — TELEPHONE ENCOUNTER
Prior authorization completed via CoverMeds for Pregabalin 50MG capsules.  Primary dx is Arthritis Peripheral Polyneuropathy M05.50    Key: [id number]  PA Case ID #: [id number]

## 2025-03-11 NOTE — TELEPHONE ENCOUNTER
A prior authorization has been started for   Current Outpatient Medications   Medication Sig Dispense Refill    pregabalin 50 MG Oral Cap Take 1 capsule (50 mg total) by mouth every 12 (twelve) hours. 60 capsule 1     Please login to [email or web address] and click \"enter a key\"    Key: [id number]

## 2025-04-15 RX ORDER — ATORVASTATIN CALCIUM 10 MG/1
TABLET, FILM COATED ORAL
Qty: 90 TABLET | Refills: 3 | Status: SHIPPED | OUTPATIENT
Start: 2025-04-15

## 2025-04-15 NOTE — TELEPHONE ENCOUNTER
Please review.  Protocol failed/has no protocol.    Last Office Visit: 05/15/2024  Please advise if refill is appropriate.    No active /future labs noted     No future appointments.  Requested Prescriptions     Pending Prescriptions Disp Refills    ATORVASTATIN 10 MG Oral Tab [Pharmacy Med Name: ATORVASTATIN 10 MG TABLET] 90 tablet 3     Sig: TOME 1 TABLETA POR VIA ORAL TODOS LOS JAIME POR LA NOCHE

## 2025-05-22 DIAGNOSIS — M19.90 ARTHRITIS: ICD-10-CM

## 2025-05-22 DIAGNOSIS — G62.9 PERIPHERAL POLYNEUROPATHY: ICD-10-CM

## 2025-05-23 RX ORDER — PREGABALIN 50 MG/1
50 CAPSULE ORAL EVERY 12 HOURS
Qty: 60 CAPSULE | Refills: 1 | Status: SHIPPED | OUTPATIENT
Start: 2025-05-23

## 2025-05-23 NOTE — TELEPHONE ENCOUNTER
Please review. Refill failed protocol.     Recent fills each # 60 : 4/12/25 , 3/10/25  Last prescription written: 1/21/25  Last office visit:  Visit date not found    No future appointments.

## (undated) DIAGNOSIS — R35.0 URINARY FREQUENCY: Primary | ICD-10-CM

## (undated) DIAGNOSIS — C61 PROSTATE CANCER (HCC): ICD-10-CM

## (undated) DEVICE — GAUZE SPONGES,12 PLY: Brand: CURITY

## (undated) DEVICE — NEEDLE GUIDE

## (undated) DEVICE — TOWEL OR BLU 16X26 STRL

## (undated) DEVICE — V2 SPECIMEN COLLECTION MANIFOLD KIT: Brand: NEPTUNE

## (undated) DEVICE — DRAPE SHEET LG

## (undated) DEVICE — GIJAW SINGLE-USE BIOPSY FORCEPS WITH NEEDLE: Brand: GIJAW

## (undated) DEVICE — Device: Brand: DEFENDO AIR/WATER/SUCTION AND BIOPSY VALVE

## (undated) DEVICE — ENDOSCOPY PACK - LOWER: Brand: MEDLINE INDUSTRIES, INC.

## (undated) DEVICE — CYSTO PACK: Brand: MEDLINE INDUSTRIES, INC.

## (undated) DEVICE — KIT ENDO ORCAPOD 160/180/190

## (undated) DEVICE — PEN 6\" SURGICAL MARKING PURPL

## (undated) DEVICE — CO2 CANNULA,SSOFT,ADLT,7O2,4CO2,FEMALE: Brand: MEDLINE

## (undated) DEVICE — ENCORE® LATEX ACCLAIM SIZE 8, STERILE LATEX POWDER-FREE SURGICAL GLOVE: Brand: ENCORE

## (undated) DEVICE — KIT CLEAN ENDOKIT 1.1OZ GOWNX2

## (undated) DEVICE — LUBRICANT JLY SURGILUBE 2OZ

## (undated) DEVICE — SYRINGE, LUER SLIP, STERILE, 60ML: Brand: MEDLINE

## (undated) DEVICE — NON-ADHERENT PAD PREPACK: Brand: TELFA

## (undated) DEVICE — MAX-CORE® DISPOSABLE CORE BIOPSY INSTRUMENT, 18G X 20CM: Brand: MAX-CORE

## (undated) DEVICE — MEDI-VAC NON-CONDUCTIVE SUCTION TUBING 6MM X 1.8M (6FT.) L: Brand: CARDINAL HEALTH

## (undated) NOTE — LETTER
03/27/19        [address]  [place] [address]      Dear Niles Bejarano,    Our records indicate that you have outstanding lab work and or testing that was ordered for you and has not yet been completed:  Orders Placed This Encounter

## (undated) NOTE — LETTER
March 18, 2019     Carl Berg  [address] [place] [address] [address]  [address] [place] [address]      Dear Vivian Luna:    Below are the results from your recent visit: the following results are within normal limits:  improved psa     Resulted Orders   PSA TOTAL W REFLEX TO FREE

## (undated) NOTE — LETTER
March 19, 2019     Harmony Swenson  [address] [place] [address] [place] [address]      Dear Niles Bejarano:    Below are the results from your recent visit: the following results are within normal limits:  Vit D.      Resulted Orders   PSA TOTAL W REFLEX TO FREE   Res

## (undated) NOTE — LETTER
3/15/2018              Mennie Hash        [address]        [place] [address]         Dear Salinas Kraft,    It was a pleasure to see you. Your EKG/Electrocardiograma was normal.  There is no need for further testing at this time.   I look forward

## (undated) NOTE — LETTER
Patient Name: Oscar Dumont : 1945  Medical Record #: [id number] Printed: 2024  Page 1 of 2                                          [facility]  [address], [place], [place]  Autorización para operación y procedimiento quirúrgico                                                                                                      Por la presente, autorizo a CLIFF Flowers MD, mi médico y al asistente a realizar la operación/procedimiento quirúrgico a continuación, así vanna a administrar la anestesia que determine necesaria mi médico Nombre (s) de la operación/procedimiento: COLONOSCOPY en Oscar Dumont     Reconozco que rian la operación/procedimiento quirúrgico, las condiciones imprevistas pueden requerir de procedimientos adicionales o diferentes a aquellos mencionados anteriormente.  Por lo tanto, autorizo y solicito además que el cirujano antes mencionado, los asistentes o las personas designadas realicen los procedimientos que, a hernandez juicio, kayode necesarios y convenientes.    Mi cirujano/médico hawley discutido antes de mi cirugía los posibles beneficios, riesgos y efectos secundarios de david procedimiento, la probabilidad de alcanzar las metas y los posibles problemas que puedan ocurrir rian la recuperación.  Ellos también richards discutido las alternativas razonables al procedimiento, incluso los riesgos, beneficios y efectos secundarios relacionados con las alternativas y los riesgos relacionados con no realizar david procedimiento.  Richards respondido a todas mis preguntas y confirmo que no se ha dado ninguna garantía en cuanto a los resultados que pueda obtener.    En wiliam de que surja la necesidad rian mi operación o rian el periodo postoperatorio, también autorizo se aplique lorenzo y/o productos sanguíneos.  Asimismo, entiendo que a pesar de las cuidadosas pruebas y análisis de lorenzo o de los productos sanguíneos que realizan las entidades recolectoras, todavía puedo  estar sujeto a efectos adversos vanna resultado de recibir cynthia transfusión de lorenzo y/o productos sanguíneos.  A continuación se mencionan algunos, aunque no todos, los riesgos potenciales que pueden ocurrir: fiebre y reacciones alérgicas, reacciones hemolíticas, trasmisión de enfermedades vanna hepatitis, SIDA y citomegalovirus (CMV), así vanna sobrecarga de líquidos.   En wiliam de que desee tener cynthia transfusión autóloga de mi propia lorenzo o cynthia transfusión de un donante dirigido.  Lo discutiré con mi médico.  Check only if Refusing Blood or Blood Products  I understand refusal of blood or blood products as deemed necessary by my physician may have serious consequences to my condition to include possible death. I hereby assume responsibility for my refusal and release the hospital, its personnel, and my physicians from any responsibility for the consequences of my refusal.           o  Refuse       Autorizo el uso de cualquier muestra, órgano, tejido, parte del cuerpo u objeto extraño que pueda ser extraído de mi cuerpo rian la operación/procedimiento para fines de diagnóstico, investigación o de enseñanza y hernandez desecho posterior por las autoridades del hospital.  También, autorizo la revelación de los resultados de las pruebas de muestras y/o los informes escritos al médico tratante vanna personal médico del hospital u otros médicos de referencia o consulta involucrados en mi atención, a discreción del patólogo o de mi médico tratante.    Doy consentimiento para que se fotografíen o graben vídeos de las operaciones o procedimientos a realizarse, incluidas las partes de mi cuerpo que kayode adecuadas para propósitos médicos, científicos o educativos, en el entendido de que, mi identidad no sea revelada por las fotografías o por textos descriptivos que las acompañen.  Si el procedimiento es fotografiado o grabado en vídeo, el cirujano obtendrá la imagen, cinta de vídeo o CD original.  El hospital no se hará  responsable por el almacenamiento, la revelación o el mantenimiento de la imagen, cinta o CD.    Autorizo la presencia de un especialista de producto u observadores en el quirófano, según lo considere necesario mi médico o las personas que éste designe.     Reconozco que en wiliam de que mi procedimiento resulte en un tiempo prolongado de radiografía/fluoroscopia, puedo desarrollar cynthia reacción en la piel.    Si tengo cynthia orden de No intentar la reanimación (JOSE), breezy estado se suspenderá mientras esté en el quirófano, la rip de procedimientos y rian el periodo de recuperación a menos que yo indique lo contrario explícitamente (o cynthia persona autorizada a justyn el consentimiento en mi nombre). El cirujano o mi médico tratante determinarán cuándo termina el periodo de recuperación aplicable a los efectos de restablecer la orden de JOSE.  Pacientes que se realizan un procedimiento de esterilización: Entiendo que si el procedimiento tiene éxito, los resultados serán permanentes y que, por lo tanto, me será imposible inseminar, concebir o tener hijos.  Asimismo, entiendo que el procedimiento tiene vanna propósito la esterilidad, aunque el resultado no está garantizado.   Admito que mi médico me ha explicado la aplicación de sedación/analgesia, incluidos los riesgos y beneficios y, consiento a la administración de sedación/analgesia conforme sea necesario o conveniente a juicio de mi médico.      CERTIFICO QUE HE LEÍDO Y COMPRENDIDO EL CONSENTIMIENTO ANTERIOR PARA LA OPERACIÓN y/o PROCEDIMIENTO.             ______________________________________  _______________________________  Firma del paciente      Firma de la persona responsible  Signature of patient      Signature of responsible person                        ___________________________________                                   Nombre en imprenta de la persona responsible         Name of responsible person          ___________________________________                  Relación con el paciente         Relationship to patient    _________________________________________  ______________ ________________  Firma del testigo          Fecha / Date Hora / Time  Signature of witness    DECLARACÓN DEL MÉDICO Mediante mi firma al calce, afirmo que antes de la hora del procedimiento, he explicadoal paciente y/o a hernandez representante legal, los riesgos y beneficios involucrados en el tratamiento propuesto, así comocualquier alternativa razonable al tratamiento propuesto. También le(s) he explicado los riesgos y beneficios involucradosen el rechazo del tratarniento propuesto y alternativas al tratamiento propuesto, y he respondido a las preguntas del(la) paciente(My signature below affirms that prior to the time of the procedure, I have explained to the patient and/or his/her guardian, therisks and benefits involved in the proposed treatment and any reasonable alternative to the proposed treatment. I have alsoexplained the risks and benefits involved in refusal of the proposed treatment and have answered the patient's questions.)    ________________________________________   _________________________   _____________   (Firma del médico/Signature of Physician)                                    (Fecha/Date)                                             (Hora/Time)      Patient Name: Oscar Dumont     : 1945                 Printed: 2024      Medical Record #: [id number]                                              Page 2 of 2

## (undated) NOTE — LETTER
01/29/20        [address]  [address] [place] [address]      Dear Taryn Cage,    Our records indicate that you have outstanding lab work and or testing that was ordered for you and has not yet been completed:  Orders Placed This Encounter

## (undated) NOTE — LETTER
[facility]   Administracion de Anestesia  Yo, Oscar Julia, acepto ser atendido por un anestesiólogo, quien está especialmente capacitado para monitorearme y darme medicamento para hacerme dormir o mantenerme cómodo rian mi procedimiento.   Entiendo que mi anestesiólogo no es un empleado o agente de [facility]. Él o evy trabaja para [facility]  Irasema el paciente que solicita los servicios de anestesia, estoy de acuerdo con lo siguiente:  Permitir al anestesiólogo (médico de anestesia) que me suministre el medicamento y hacer los procedimientos adicionales que kayode necesarios. Algunos ejemplos son: Al iniciar o utilizar cynthia “IV” para suministrarme medicamentos, fluidos o lorenzo rian mi procedimiento, y colocarme cynthia sonda de respiración, me ayudará a respirar cuando esté dormido (intubación). En el wiliam de que mi corazón deje de funcionar correctamente, entiendo que mi anestesiólogo hará todo lo posible para salvar mi jaylen, a menos que los documentos de No resucitar de [facility] lo indiquen de otra manera.  Informar a mi anestesista antes del procedimiento:  Si estoy embarazada.  La última vez que comí o bebí algo.  Todos los medicamentos que sp (incluyendo medicamentos recetados, suplementos herbales y pastillas que puedo comprar sin receta médica (incluyendo drogas en la benoit [medicamentos ilegales]). No informar a mi anestesiólogo acerca de estos medicamentos puede aumentar mi riesgo de complicaciones con la anestesia.  Si soy alérgico a cualquier cosa o he tenido previamente cynthia reacción adversa a la anestesia.  Entiendo cómo la anestesia me ayudará (beneficios).  Entiendo que con cualquier tipo de anestesia hay riesgos:  Los riesgos más comunes son: náuseas, vómitos, dolor de garganta, dolor muscular, daño a los ojos, la boca o los dientes (por la colocación de la sonda de respiración).  Los riesgos poco comunes incluyen: recordar  lo que sucedió rian mi procedimiento, reacciones alérgicas a los medicamentos, lesiones en las vías respiratorias, el corazón, los pulmones, la visión, los nervios o músculos, y en casos sumamente raros, la muerte.  Mii médico me ha explicado otras opciones de atención disponibles para mí (alternativas).  Pacientes embarazadas (“epidural”):    Entiendo que los riesgos de recibir cynthia inyección epidural (medicamento que se aplica en la espalda para ayudar a controlar el dolor rian el parto), incluyen picazón, presión arterial baja, dificultad para orinar, dolor de livier o disminución en el ritmo del corazón del bebé. Los riesgos muy poco comunes incluyen infección, hemorragia, convulsiones, ritmo cardíaco irregular y lesión del nervio.  Anestesia regional (“columna vertebral”, “epidural” y “bloqueo de los nervios”):  Entiendo que hay complicaciones poco frecuentes reed posibles, e incluyen dolor de livier, sangrado, infección, convulsiones, ritmo cardíaco irregular y la lesión del nervio.  .   Puedo cambiar de opinión acerca de recibir servicios de anestesia en cualquier momento antes de que se me administre el medicamento.         Paciente (o representante) Firma/Relación con el paciente  Fecha  Hora  Patient Signature/Signature of Responsible person Date Time           Nombre del intérprete (en hernandez wiliam)  Idioma/Organización  Hora  Name of  Language/Organization Time          Firma del anestesiólogo Fecha  Hora  Signature of anesthesiologist Date Time    He hablado del procedimiento y la información anterior con el paciente (o el representante del paciente) y respondí mariza preguntas. El paciente o hernandez representante ha aceptado recibir los servicios de anestesia.         Testigo Fecha  Hora  Witness Date Time  He verificado que la firma es la del paciente o del representante del paciente, y que se firmó antes del procedimiento.    Nombre del paciente: Oscar espinoza Nac.: 1/2/1945                  En letra de imprenta: November 1, 2024  Expediente médico No. [id number]                         Página 1 de 2  ----------ANESTHESIA CONSENT----------

## (undated) NOTE — LETTER
3/7/2023    [facility]        [address] [facility]            Dear Darrick Bro,      Our records indicate that you are due for an appointment for a Colonoscopy with José Munroe MD. Our doctors are booking out about 3-5 months in advance for procedures. Please call our office to schedule a phone screening appointment to plan for the procedure(s). Your medical well-being is important to us. If your insurance requires a referral, please call your primary care office to request one.       Thank you,      The Physicians and Staff at [facility]

## (undated) NOTE — MR AVS SNAPSHOT
[address], [address]  [address] [facility]  [phone]               Thank you for choosing us for your health care visit with Maritza Salinas MD.  We are glad to serve you and happy to provide you with this summ Hemoglobin A1C [E]    Complete by:  Feb 22, 2017 (Approximate)    Assoc Dx:  Physical exam [Z00.00], Pure hypercholesterolemia [E78.00], Meniere disease, unspecified laterality [H81.09], Diverticulosis of large intestine without hemorrhage [K57.30], Polyp None      Allergies as of Feb 22, 2017     No Known Allergies                Today's Vital Signs     BP Pulse Temp Height Weight BMI    143/79 mmHg 79 97.8 °F (36.6 °C) (Oral) 5' 10\" (1.778 m) 209 lb (94.802 kg) 29.99 kg/m2         Current Medications visit,  view other health information, and more. To sign up or find more information, go to [email or web address] and click on the Sign Up Now link in the [employer] box.      Enter your [employer] Activation Code exactly as it appears below along with yo

## (undated) NOTE — LETTER
June 22, 2020     [address] [facility] [address]  [address] [facility]      Dear Cornelia Marshall:    Below are the results from your recent visit:    Resulted Orders   CBC WITH DIFFERENTIAL WITH PLATELET   Result Value Ref Range    WHITE BLOOD CELL COUNT 5.4 LDL-CHOLESTEROL 142 (H) mg/dL (calc)    CHOL/HDLC RATIO 5.0 (H) <5.0 (calc)    NON-HDL CHOLESTEROL 165 (H) <130 mg/dL (calc)   PSA TOTAL W REFLEX TO FREE   Result Value Ref Range    TOTAL PSA 2.2 < OR = 4.0 ng/mL    FREE PSA 0.5 ng/mL    % FREE PSA 23 (L)

## (undated) NOTE — LETTER
07/17/20    [address]  [facility] [address]      Dear Kelsey Tovar,    Our records indicate that you have outstanding lab work and or testing that was ordered for you and has not yet been completed:  Orders Placed This [address]